# Patient Record
Sex: MALE | Race: AMERICAN INDIAN OR ALASKA NATIVE
[De-identification: names, ages, dates, MRNs, and addresses within clinical notes are randomized per-mention and may not be internally consistent; named-entity substitution may affect disease eponyms.]

---

## 2017-01-22 NOTE — EMERGENCY DEPARTMENT REPORT
HPI





- General


Chief Complaint: Psych


Time Seen by Provider: 01/22/17 02:36





- HPI


HPI: 





The patient is a 26-year-old male who presents for evaluation of mental health.

  The patient reports 1 day of constant and severe thoughts of sadness and 

suicidal ideation.  He states that he is concerned that he will harm himself, 

as he has thought of stabbing himself with a knife.  He also reports auditory 

hallucinations. The patient denies fever, headache, unexplained weight loss or 

weight gain, heat or cold intolerance, skin, hair, or nail changes, neuro 

deficits, homicidal ideations, or visual hallucinations.





ED Past Medical Hx





- Past Medical History


Previous Medical History?: Yes


Hx Hypertension: Yes


Hx Psychiatric Treatment: Yes (anxiety, schizophrenia, bipolar)





- Surgical History


Past Surgical History?: No





- Social History


Smoking Status: Current Every Day Smoker


Substance Use Type: Alcohol, Marijuana





- Medications


Home Medications: 


 Home Medications











 Medication  Instructions  Recorded  Confirmed  Last Taken  Type


 


Divalproex Dr [Depakote] 500 mg PO BID 10/06/14 10/08/15 10/08/15 History


 


Benztropine [Cogentin] 1 mg PO QHS 03/06/15 10/08/15 10/08/15 History


 


risperiDONE [RisperDAL] 5 mg PO QHS 10/08/15 10/08/15 10/08/15 History














ED Review of Systems


ROS: 


Stated complaint: MH EVAL


Other details as noted in HPI








Constitutional: denies: fever


ENT: denies: throat or neck pain


Respiratory: denies: cough, shortness of breath


Cardiovascular: denies: chest pain


Endocrine: denies unexplained weight loss or gain


Gastrointestinal: denies: abdominal pain, nausea


Genitourinary: denies: dysuria


Musculoskeletal: denies: leg swelling


Skin: denies: rash


Neurological: denies: headache


Hematological/Lymphatic: denies: easy bleeding or easy bruising  


Psych: reports sadness and SI











Physical Exam





- Physical Exam


Vital Signs: 


 Vital Signs











  01/22/17 01/22/17





  01:25 02:35


 


Temperature 98.0 F 98.5 F


 


Pulse Rate 114 H 80


 


Respiratory 18 151 H





Rate  


 


Blood Pressure 119/86 


 


Blood Pressure  127/75





[Right]  


 


O2 Sat by Pulse 98 99





Oximetry  











Physical Exam: 











General: well-nourished, well-developed, no acute distress


Head: Normocephalic, atraumatic


Eyes: normal sclera


ENT: Mucous membranes are pale and dry


Neck: trachea midline, neck supple, No neck stiffness, no cervical adenopathy


Respiratory: Breath sounds equal bilaterally, no wheezing, rales, or rhonchi


Cardio: S1 and S2 present, no murmurs, rubs, gallops, capillary refill is 

delayed


Abdomen: Normoactive bowel sounds, soft abdomen, no rigidity, no guarding or 

rebound tenderness


Musc: No pitting edema


Skin: No rash


Neuro: no facial drooping, normal speech


Psych: Normal affect











ED Course


 Vital Signs











  01/22/17 01/22/17





  01:25 02:35


 


Temperature 98.0 F 98.5 F


 


Pulse Rate 114 H 80


 


Respiratory 18 151 H





Rate  


 


Blood Pressure 119/86 


 


Blood Pressure  127/75





[Right]  


 


O2 Sat by Pulse 98 99





Oximetry  














ED Medical Decision Making





- Lab Data


Result diagrams: 


 01/22/17 02:20





 01/22/17 02:20





- Medical Decision Making





The patient was seen and examined by myself.  The patient is placed on a 

cardiac monitor and continuous pulse ox. On initial evaluation, the patient was 

found to be in no distress.  Labs are obtained. Lab results are grossly 

unremarkable.  The patient is medically clear.  Mental health is consulted. 

Mental health evaluates the patient and agrees that the patient is at risk of 

harm to self.  A 1013 is completed.  The patient will be admitted to a 

psychiatric facility once bed placement is obtained.





Critical care attestation.: 


If time is entered above; I have spent that time in minutes in the direct care 

of this critically ill patient, excluding procedure time.








ED Disposition


Clinical Impression: 


 Suicidal ideations


Disposition: DC/TX PSY HOSP/PSY UNIT


Is pt being admited?: No


Does the pt Need Aspirin: No


Condition: Stable


Referrals: 


PRIMARY CARE,MD [Primary Care Provider] - 3-5 Days


Time of Disposition: 03:08

## 2017-02-19 NOTE — EMERGENCY DEPARTMENT REPORT
HPI





- General


Chief Complaint: Psych


Time Seen by Provider: 02/19/17 09:06





- HPI


HPI: 





 


Chief complaint: Suicidal thoughts and hearing voices


HPI: Patient with a history of bipolar disorder and schizoaffective disorder 

presents today saying he is having suicidal thoughts and is hearing voices.  

Patient also complains of having some abdominal crampy pain with 4 episodes of 

diarrhea earlier that has since stopped.  Patient states he has trouble with 

constipation because someone is blocking his bowels.  Patient states that he 

did not take his medications yesterday as someone has been tampering with them.

  She would not elaborate further.


Mode of arrival:   private car 


Source: Patient


Began: Patient unable to say how long he's been having these feelings


Duration: Long-standing history of psychiatric illness


Context: See above


Quality: Currently pain-free


Severity: 0 out of 10


Improved with: Nothing


Worsened with: Nothing


Associated signs and symptoms: See above





ED Past Medical Hx





- Past Medical History


Hx Hypertension: Yes


Hx Psychiatric Treatment: Yes (anxiety, schizophrenia, bipolar)





- Surgical History


Past Surgical History?: No





- Social History


Smoking Status: Current Every Day Smoker


Substance Use Type: Alcohol





- Medications


Home Medications: 


 Home Medications











 Medication  Instructions  Recorded  Confirmed  Last Taken  Type


 


Divalproex Dr [Depakote] 500 mg PO BID 10/06/14 01/22/17 10/08/15 History


 


Benztropine [Cogentin] 1 mg PO QHS 03/06/15 01/22/17 10/08/15 History


 


risperiDONE [RisperDAL] 5 mg PO QHS 10/08/15 01/22/17 10/08/15 History


 


Haloperidol [Haldol] 5 mg PO HS 02/19/17 02/19/17 02/18/17 History














ED Review of Systems


ROS: 


Stated complaint: MH EVAL


Other details as noted in HPI


ROS





Constitutional: No fever 


ENT: No uri symptoms


Cardiovascular: No chest pain


Respiratory: No sob or cough


GI: No nausea vomiting 


: No dysuria frequency or urgency, 


Skin: No rash


Neuro: No focal weakness or numbness


Psych: He HPI


Hema/lymph: No edema





Physical Exam





- Physical Exam


Vital Signs: 


 Vital Signs











  02/19/17 02/19/17





  07:33 08:22


 


Temperature 98.1 F 


 


Pulse Rate 95 H 


 


Respiratory 18 20





Rate  


 


Blood Pressure 117/75 


 


O2 Sat by Pulse 99 98





Oximetry  











Physical Exam: 





GENERAL: The patient is well-developed well-nourished . 


HEENT: Normocephalic.  Atraumatic.  Extraocular motions are intact.  Patient 

has moist mucous membranes.


NECK: Supple.  No meningitic signs are noted.  There is no adenopathy noted.


CHEST/LUNGS: Clear to auscultation.  There is no respiratory distress noted.


HEART/CARDIOVASCULAR: Regular.  There is no tachycardia.  There is no gallop 

rub or murmur.


ABDOMEN: Abdomen is soft, nontender.  Patient has normal bowel sounds.  There 

is no abdominal distention.


SKIN: There is no rash.  There is no edema.  There is no diaphoresis.


NEURO: The patient is awake, alert, and oriented.  Patient is slightly 

paranoid.  The patient is cooperative.  The patient has no focal neurologic 

deficits.  The patient has normal speech.


MUSCULOSKELETAL: There is no tenderness or deformity.  There is no limitation 

range of motion.  There is no evidence of acute injury.





ED Course


 Vital Signs











  02/19/17 02/19/17





  07:33 08:22


 


Temperature 98.1 F 


 


Pulse Rate 95 H 


 


Respiratory 18 20





Rate  


 


Blood Pressure 117/75 


 


O2 Sat by Pulse 99 98





Oximetry  














- Reevaluation(s)


Reevaluation #1: 





02/19/17 


Patient evaluated by mental health crisis and 1013 was executed.








ED Medical Decision Making





- Lab Data


Result diagrams: 


 02/19/17 07:58





 02/19/17 07:58





 Laboratory Tests











  02/19/17 02/19/17





  07:58 07:58


 


Valproic Acid   9.1 L


 


Plasma/Serum Alcohol  < 0.01 








Urinalysis and urine drug screen are negative.


Critical care attestation.: 


If time is entered above; I have spent that time in minutes in the direct care 

of this critically ill patient, excluding procedure time.








ED Disposition


Clinical Impression: 


 Suicidal ideations





Disposition: DC/TX PSY HOSP/PSY UNIT


Is pt being admited?: No


Does the pt Need Aspirin: No


Condition: Stable


Time of Disposition: 10:00

## 2017-02-20 NOTE — CONSULTATION
History of Present Illness





- Reason for Consult


Consult date: 02/20/17


Reason for consult: evaluation for placement into emergency receiving facility





- Chief Complaint


Chief complaint: 





I thought they were messing with my medications





- History of Present Psychiatric Illness





This is a 26 year old domiciled male with PPH of Schizophrenia who presents due 

to either a persistent psychosis or a reemergence of psychosis over the past 

few days. Psychiatry was consulted to evaluate and to help facilitate placement 

of this patient. Upon interview, he corroborated the history noted in the 

remainder of the chart and that of the Mental Health assessors. Notably, that 

he was having thoughts of suicide and that he woke up not feeling well. He has 

been inconsistently adherent to his medications due to a paranoid belief that 

someone is tampering with the medication. 





Medications and Allergies


 Allergies











Allergy/AdvReac Type Severity Reaction Status Date / Time


 


quetiapine fumarate Allergy  Unknown Verified 02/19/17 07:33





[From Seroquel]     











 Home Medications











 Medication  Instructions  Recorded  Confirmed  Last Taken  Type


 


Divalproex Dr [Depakote] 500 mg PO BID 10/06/14 02/19/17 02/18/17 History


 


Benztropine [Cogentin] 1 mg PO QHS 03/06/15 02/19/17 02/18/17 History


 


risperiDONE [RisperDAL] 5 mg PO QHS 10/08/15 02/19/17 02/18/17 History


 


Haloperidol [Haldol] 5 mg PO HS 02/19/17 02/19/17 02/18/17 History














Mental Status Exam





- Vital signs


 Last Vital Signs











Temp  98.3 F   02/20/17 10:00


 


Pulse  70   02/20/17 10:00


 


Resp  18   02/20/17 10:00


 


BP  116/68   02/20/17 10:00


 


Pulse Ox  99   02/20/17 10:00














- Exam


Orientation: person


Affect: depressed, anxious


Mood: fearful, anxious


Thought content: obsessions, phobias, paranoia, somatic


Thought Process: Disorganized


Perceptions: auditory


Speech: slow


Concentration: distractible


Motor activity: lethargic


Level of consciousness: sedated


Memory: Recent Impaired, Remote Impaired


Sleep Symptoms: None


Interaction: guarded





Results


Result Diagrams: 


 02/19/17 07:58





 02/19/17 07:58


All other labs normal.








Assessment and Plan


Assessment and plan: 





This is a 26 year old domiciled male with schizophrenia who presents with 

worsening psychosis in the context of intermittent medication non-adherence 

associated to paranoia. We recommend transferring him to [facility], where he has been accepted by their emergency receiving psychiatric 

facility.

## 2017-04-11 NOTE — CONSULTATION
History of Present Illness





- Reason for Consult


Consult date: 04/11/17


Reason for consult: Psychiatry Follow-up


Requesting physician: SPARKLE ORTIZ





- Chief Complaint


Chief complaint: 


"I just need some help"








- History of Present Psychiatric Illness


27 y.o. AA male admitted to [facility] for psychosis. Today patient is calm, 

cooperative with a linear thought process. Patient acknowledged calling the 

police to pick him up from the group, because he wasn't "feeling right." He 

told me that he is having issues at his current group home and experiencing AVH'

s intermittently. He could not tell me what the visual hallucination is 

currently, but states the voices tell him to do all sorts of "crazy" things. I 

asked did the voices tell him to harm himself or anyone else, he states "yes" 

to harming himself, "no" to harming anyone else. He could not tell me if he had 

a suicide plan. He stated that he was hospitalized for hearing voices with 

suicidal thoughts in the past. He denies having issues with a group of 

residents or a particular person at the group home. He states that it's a lot 

of on going activity there and getting rest and sleep is a problem. He denies 

using recreational drugs, but drink seldom. He feels sad and hopeless and rate 

his depression 7/10, with 10 being the worst. He states that he is compliant 

with his medications. He denies HI's at this time.








Medications and Allergies


 Allergies











Allergy/AdvReac Type Severity Reaction Status Date / Time


 


quetiapine fumarate Allergy  Unknown Verified 02/19/17 07:33





[From Seroquel]     











 Home Medications











 Medication  Instructions  Recorded  Confirmed  Last Taken  Type


 


Divalproex Dr [Depakote] 500 mg PO BID 10/06/14 04/11/17 04/09/17 History


 


Benztropine [Cogentin] 1 mg PO QHS 03/06/15 04/11/17 04/09/17 History


 


risperiDONE [RisperDAL] 5 mg PO QHS 10/08/15 04/11/17 04/09/17 History


 


Haloperidol [Haldol] 5 mg PO HS 02/19/17 04/11/17 04/09/17 History











Active Meds: 


Active Medications





Benztropine Mesylate (Cogentin)  1 mg PO QHS JE


Divalproex Sodium (Depakote Dr)  500 mg PO BID [facility]


   Last Admin: 04/11/17 09:56 Dose:  500 mg


Haloperidol (Haldol)  5 mg PO HS JE


Risperidone (Risperdal)  3 mg PO QHS JE


Risperidone (Risperdal)  2 mg PO QHS JE











Past psychiatric history





- Past Medical History


Past Medical History: No medical history


Past Surgical History: No surgical history





- past Psychiatric treatment and history


Psych: Bipolar, Schizophrenia


psychiatric treatment history: 


Per patient been to [facility]. Denies fam psy hx. 








- Social History


Social history: other (Lives at a group home, did not finish , )





Mental Status Exam





- Vital signs


 Last Vital Signs











Temp  97.8 F   04/11/17 07:40


 


Pulse  70   04/11/17 07:40


 


Resp  20   04/11/17 07:40


 


BP  101/66   04/11/17 07:40


 


Pulse Ox  99   04/11/17 07:40














- Exam


Narrative exam: 


ROS (-) Suicidal Thoughts, (+) Depression





Orientation: time, place, person


Affect: flat


Mood: congruent with affect


Thought content: other (None)


Thought Process: Intact


Perceptions: none


Speech: normal rate and pattern


Concentration: other (Intact)


Motor activity: other (Lying in bed)


Level of consciousness: alert


Memory: Intact


Sleep Symptoms: None


Interaction: cooperative, pleasant





Results


Result Diagrams: 


 04/11/17 02:36





 04/11/17 02:36


 Abnormal lab results











  04/11/17 04/11/17 04/11/17 Range/Units





  02:36 02:36 04:10 


 


MCHC   35 H   (32-34)  %


 


Mono % (Auto)   8.5 H   (0.0-7.3)  %


 


BUN  6 L    (9-20)  mg/dL


 


Valproic Acid    13.8 L  ()  ug/mL








All other labs normal.








Assessment and Plan


Assessment and plan: 


Impression: Psychosis NOS. 27 y.o. AA male admitted to [facility] for psychosis. 

Today patient is calm, cooperative with a linear thought process. Patient 

acknowledged calling the police to pick him up from the group, because he wasn'

t "feeling right." He admit suicidal thoughts and AVH's intermittently. He 

stated that he is compliant with his medication, but have not taken his 

depakote regularly. VA level 13.8. DD: R/O Bipolar





Recommendation/Plan: Continue 1013 with possible placement to inpatient psy 

services. Metabolic side effects discussed with patient reference 

antipsychotics.

## 2017-04-11 NOTE — EMERGENCY DEPARTMENT REPORT
ED Psych HPI





- General


Chief Complaint: Psych


Stated Complaint: MH EVAL/RT FOOT PAIN


Time Seen by Provider: 04/11/17 03:23


Source: patient


Mode of arrival: Ambulatory





- History of Present Illness


Initial Comments: 





Aline is a pleasant young man who lives in a group home.  He states he has 

difficulties in the group home of getting sleep due to all the activity going 

on.  He states he is, constantly hearing voices in his head telling him to do 

all sorts of things as well.  He finds a voices distracting and difficult to 

concentrate and sleep as well.  He feels frequently that the voices are telling 

him to do harm to himself.  He does not have any homicidal ideations.  He does 

not have a specific suicidal plan but has had thoughts of suicide.  Patient 

states he feels that he needs inpatient psychiatric therapy.  He denies any 

drugs of abuse.  He denies illicit.  He states he is compliant with his 

medications.  He does have a psychiatrist here in town when she follows up with 

routinely.  He states his last appointment was 2 weeks ago.  Patient reports 

feeling comfortable with this psychiatrist.  He states in general he is 

comfortable with his group home but that he just feels he needs a break so that 

he can get better sleep and get his "head on straight."





Patient also complains of tinea pedis times long time that is gone untreated.  

It is itchy.  It sometimes causes his feet to burn


Associated Psychiatric Symptoms: depression, suicidal ideation


History of same: Yes


Treatments Prior to Arrival: none





- Related Data


 Home Medications











 Medication  Instructions  Recorded  Confirmed  Last Taken


 


Divalproex Dr [Depakote] 500 mg PO BID 10/06/14 02/19/17 02/18/17


 


Benztropine [Cogentin] 1 mg PO QHS 03/06/15 02/19/17 02/18/17


 


risperiDONE [RisperDAL] 5 mg PO QHS 10/08/15 02/19/17 02/18/17


 


Haloperidol [Haldol] 5 mg PO HS 02/19/17 02/19/17 02/18/17











 Allergies











Allergy/AdvReac Type Severity Reaction Status Date / Time


 


quetiapine fumarate Allergy  Unknown Verified 02/19/17 07:33





[From Seroquel]     














ED Review of Systems


ROS: 


Stated complaint: MH EVAL/RT FOOT PAIN


Other details as noted in HPI





Comment: All other systems reviewed and negative


Constitutional: denies: chills, fever


Eyes: denies: eye pain, eye discharge, vision change


ENT: denies: ear pain, throat pain


Respiratory: denies: cough, shortness of breath, wheezing


Cardiovascular: denies: chest pain, palpitations


Endocrine: no symptoms reported


Gastrointestinal: denies: abdominal pain, nausea, diarrhea


Genitourinary: denies: urgency, dysuria


Musculoskeletal: denies: back pain, joint swelling, arthralgia


Skin: other (skin lesions right foot.).  denies: rash, lesions


Neurological: denies: headache, weakness, paresthesias


Psychiatric: anxiety, depression, suicidal thoughts


Hematological/Lymphatic: denies: easy bleeding, easy bruising





ED Past Medical Hx





- Past Medical History


Previous Medical History?: Yes


Hx Hypertension: Yes


Hx Psychiatric Treatment: Yes (anxiety, schizophrenia, bipolar)





- Surgical History


Past Surgical History?: Yes





- Social History


Smoking Status: Current Every Day Smoker


Substance Use Type: Alcohol





- Medications


Home Medications: 


 Home Medications











 Medication  Instructions  Recorded  Confirmed  Last Taken  Type


 


Divalproex Dr [Depakote] 500 mg PO BID 10/06/14 02/19/17 02/18/17 History


 


Benztropine [Cogentin] 1 mg PO QHS 03/06/15 02/19/17 02/18/17 History


 


risperiDONE [RisperDAL] 5 mg PO QHS 10/08/15 02/19/17 02/18/17 History


 


Haloperidol [Haldol] 5 mg PO HS 02/19/17 02/19/17 02/18/17 History














ED Physical Exam





- General


Limitations: No Limitations


General appearance: alert, in no apparent distress





- Head


Head exam: Present: atraumatic, normocephalic





- Eye


Eye exam: Present: normal appearance, EOMI.  Absent: scleral icterus





- ENT


ENT exam: Present: normal exam, normal orophraynx, mucous membranes moist





- Neck


Neck exam: Present: normal inspection.  Absent: tenderness, meningismus, 

lymphadenopathy





- Respiratory


Respiratory exam: Present: normal lung sounds bilaterally.  Absent: respiratory 

distress, wheezes, rales





- Cardiovascular


Cardiovascular Exam: Present: regular rate, normal rhythm.  Absent: systolic 

murmur, diastolic murmur, rubs, gallop





- GI/Abdominal


GI/Abdominal exam: Present: soft, normal bowel sounds.  Absent: tenderness, 

guarding





- Rectal


Rectal exam: Present: deferred





- Extremities Exam


Extremities exam: Present: other (right foot increases and on the ball of foot 

with chronic skin changes consistent with tinea pedis.).  Absent: pedal edema, 

joint swelling, calf tenderness





- Back Exam


Back exam: Present: normal inspection, full ROM.  Absent: tenderness, CVA 

tenderness (R), CVA tenderness (L)





- Neurological Exam


Neurological exam: Present: alert, oriented X3





- Psychiatric


Psychiatric exam: Present: normal affect, normal mood, suicidal ideation





- Skin


Skin exam: Present: warm, dry, intact, normal color.  Absent: rash





ED Course





 Vital Signs











  04/11/17 04/11/17





  02:09 02:24


 


Temperature 98.1 F 98.1 F


 


Pulse Rate 74 75


 


Respiratory 18 18





Rate  


 


Blood Pressure  118/81


 


Blood Pressure 118/61 





[Right]  


 


O2 Sat by Pulse 100 100





Oximetry  














- Reevaluation(s)


Reevaluation #1: 





04/11/17 05:23


Patient very pleasant and calm here.  He is speaking in normal rate and tone.  

He does endorse suicidal thoughts.  He states that there is some more of a 

flight of ideas.  He states he is voices that she talk to him.  He reports the 

voices are sometimes helpful and sometimes mean.  Again his main concern is in 

his opinion is that he is just not getting enough sleep to deal with his 

problems well.  He strongly expresses a desire to be in inpatient psychiatry.  

To some degree I feel like he is borderline for needing inpatient care.  He 

does legitimately endorse some suicidal thoughts spell.  He states he has never 

acted out on the suicidal gesture in the past.  He states he's always been 

compliant with his medications.  These are reasons why I feel these low risk in 

general.  Despite this for now I will have him on 1013 for further evaluation 

by crisis representative as well as psychiatry.  Intake labs have been 

evaluated and are normal.  Patient is medically clear from my standpoint.





Of secondary note patient does demonstrate tinea pedis.  This is gone untreated 

for several years it appears.  I did encourage him to get this finally treated.

  It'll require about 8-12 weeks of continuous treatment with some type of 

antifungal medication.





ED Medical Decision Making





- Lab Data


Result diagrams: 


 04/11/17 02:36





 04/11/17 02:36


Critical care attestation.: 


If time is entered above; I have spent that time in minutes in the direct care 

of this critically ill patient, excluding procedure time.








ED Disposition


Clinical Impression: 


 Suicidal ideations, Tinea pedis of right foot





Depression


Qualifiers:


 Depression Type: major depressive disorder Major depression recurrence: 

recurrent Active/Remission status: currently active Major depression episode 

severity: moderate Qualified Code(s): F33.1 - Major depressive disorder, 

recurrent, moderate





Disposition: DC/TX PSY HOSP/PSY UNIT


Is pt being admited?: No


Does the pt Need Aspirin: No


Condition: Stable


Referrals: 


PRIMARY CARE,MD [Primary Care Provider] - 3-5 Days


Time of Disposition: 05:25

## 2017-04-23 NOTE — EMERGENCY DEPARTMENT REPORT
ED Psych HPI





- General


Chief Complaint: Psych


Stated Complaint: HOMICIDAL/SUICIDAL


Time Seen by Provider: 04/23/17 00:58


Source: patient, police


Mode of arrival: Ambulatory


Limitations: No Limitations





- History of Present Illness


Initial Comments: 


This is a 21-year-old male.  He has a history of psychosis.  He presents to the 

ER complaining of suicidality or hallucinations.  He does not of access to guns 

or firearms.  He denies toxic ingestions.  He denies headache, neck pain, chest 

pain, abdominal pain or shortness of breath.  He denies testicular pain.  He 

denies irritative obstructive urinary symptoms.





He cannot describe exacerbating or relieving factors.





MD Complaint: suicidal ideation


-: Gradual


Associated Psychiatric Symptoms: suicidal ideation


History of same: Yes


Quality: intermittent


Improves With: none


Worsens With: none


Associated Symptoms: denies: confusion, headache, shortness of breath, nausea, 

vomiting, syncope, insomnia


If Self Harm: admits thoughts of





- Related Data


 Home Medications











 Medication  Instructions  Recorded  Confirmed  Last Taken


 


Divalproex Dr [Depakote] 500 mg PO BID 10/06/14 04/23/17 04/09/17


 


Benztropine [Cogentin] 1 mg PO QHS 03/06/15 04/23/17 04/09/17


 


risperiDONE [RisperDAL] 5 mg PO QHS 10/08/15 04/23/17 04/09/17


 


Haloperidol [Haldol] 5 mg PO HS 02/19/17 04/23/17 04/09/17











 Allergies











Allergy/AdvReac Type Severity Reaction Status Date / Time


 


quetiapine fumarate Allergy  Unknown Verified 02/19/17 07:33





[From Seroquel]     














ED Review of Systems


ROS: 


Stated complaint: HOMICIDAL/SUICIDAL


Other details as noted in HPI





Constitutional: denies: fever


Eyes: denies: vision change


ENT: denies: epistaxis


Respiratory: denies: cough


Cardiovascular: denies: chest pain


Gastrointestinal: denies: abdominal pain, nausea, diarrhea


Genitourinary: denies: urgency, dysuria


Musculoskeletal: back pain.  denies: joint swelling, arthralgia


Skin: lesions.  denies: rash


Neurological: denies: headache, weakness, paresthesias


Psychiatric: denies: homicidal thoughts, suicidal thoughts





ED Past Medical Hx





- Past Medical History


Previous Medical History?: Yes


Hx Hypertension: Yes


Hx Psychiatric Treatment: Yes (anxiety, schizophrenia, bipolar)





- Surgical History


Past Surgical History?: No





- Social History


Smoking Status: Never Smoker


Substance Use Type: None





- Medications


Home Medications: 


 Home Medications











 Medication  Instructions  Recorded  Confirmed  Last Taken  Type


 


Divalproex Dr [Depakote] 500 mg PO BID 10/06/14 04/23/17 04/09/17 History


 


Benztropine [Cogentin] 1 mg PO QHS 03/06/15 04/23/17 04/09/17 History


 


risperiDONE [RisperDAL] 5 mg PO QHS 10/08/15 04/23/17 04/09/17 History


 


Haloperidol [Haldol] 5 mg PO HS 02/19/17 04/23/17 04/09/17 History














ED Physical Exam





- General


Limitations: No Limitations


General appearance: alert, in no apparent distress





- Head


Head exam: Present: atraumatic, normocephalic





- Eye


Eye exam: Present: normal appearance, EOMI.  Absent: nystagmus





- ENT


ENT exam: Present: normal exam, normal orophraynx, mucous membranes moist, 

normal external ear exam





- Neck


Neck exam: Present: normal inspection, full ROM.  Absent: tenderness, 

meningismus





- Respiratory


Respiratory exam: Present: normal lung sounds bilaterally.  Absent: respiratory 

distress, wheezes, rales, rhonchi, stridor, chest wall tenderness





- Cardiovascular


Cardiovascular Exam: Present: regular rate, normal rhythm, normal heart sounds.

  Absent: bradycardia, tachycardia, irregular rhythm, systolic murmur, 

diastolic murmur, rubs, gallop





- GI/Abdominal


GI/Abdominal exam: Present: soft, normal bowel sounds.  Absent: distended, 

tenderness, guarding, rebound, rigid, pulsatile mass





- Rectal


Rectal exam: Present: deferred





- Extremities Exam


Extremities exam: Present: normal inspection, full ROM, normal capillary 

refill.  Absent: pedal edema, joint swelling, calf tenderness





- Back Exam


Back exam: Present: normal inspection, other (left paralumbar region, there is 

a small area of induration, with minimal drainage.  There is no large area of 

streaking.).  Absent: CVA tenderness (L), muscle spasm, paraspinal tenderness, 

vertebral tenderness





- Neurological Exam


Neurological exam: Present: alert, oriented X3, normal gait, other (Extraocular 

movements intact.  Tongue midline.  No facial droop.  Facial sensation intact 

to light touch in the V1, V2, V3 distribution bilaterally.  5 and 5 strength in 

4 extremities..  Sensation is intact to light touch in 4 extremities.).  Absent

: motor sensory deficit





- Psychiatric


Psychiatric exam: Present: suicidal ideation.  Absent: homicidal ideation





- Skin


Skin exam: Present: warm, dry, intact, normal color.  Absent: rash





ED Course


 Vital Signs











  04/23/17





  01:15


 


Temperature 99.3 F


 


Pulse Rate 93 H


 


Respiratory 18





Rate 


 


Blood Pressure 109/78


 


Blood Pressure 109/78





[Right] 


 


O2 Sat by Pulse 99





Oximetry 














- Reevaluation(s)


Reevaluation #1: 





04/23/17 04:31





Differential diagnosis: Medical clearance for psychiatric placement, 

psychiatric to compensation, suicidality, skin pustule





Assessment and plan: 27-year-old male with suicidality.  Walks with a steady 

gait.  Has a GCS of 15, with an NIH score of 0.  Clinically sober.  No 

indication of trauma.  A 1013 form is filled out by me.





Has a small area of left paralumbar pustule that is draining, possible 

component of cellulitis.  He'll be started on doxycycline for this.  His 

medications haven't reconciled.  Urinalysis is pending.





At this point in time, it does not appear that there is any immediate medical 

complications to psychiatric admission/evaluation.  The crisis worker is 

informed.











ED Medical Decision Making





- Lab Data


Result diagrams: 


 04/23/17 01:01





 04/23/17 01:01








 Vital Signs











  04/23/17





  01:15


 


Temperature 99.3 F


 


Pulse Rate 93 H


 


Respiratory 18





Rate 


 


Blood Pressure 109/78


 


Blood Pressure 109/78





[Right] 


 


O2 Sat by Pulse 99





Oximetry 














 Lab Results











  04/23/17 04/23/17 04/23/17 Range/Units





  01:01 01:01 01:01 


 


WBC  10.0    (4.5-11.0)  K/mm3


 


RBC  4.61    (3.65-5.03)  M/mm3


 


Hgb  14.6    (11.8-15.2)  gm/dl


 


Hct  42.6    (35.5-45.6)  %


 


MCV  92    (84-94)  fl


 


MCH  32    (28-32)  pg


 


MCHC  34    (32-34)  %


 


RDW  13.8    (13.2-15.2)  %


 


Plt Count  197    (140-440)  K/mm3


 


Lymph % (Auto)  20.0    (13.4-35.0)  %


 


Mono % (Auto)  8.7 H    (0.0-7.3)  %


 


Eos % (Auto)  1.2    (0.0-4.3)  %


 


Baso % (Auto)  0.7    (0.0-1.8)  %


 


Lymph #  2.0    (1.2-5.4)  K/mm3


 


Mono #  0.9 H    (0.0-0.8)  K/mm3


 


Eos #  0.1    (0.0-0.4)  K/mm3


 


Baso #  0.1    (0.0-0.1)  K/mm3


 


Seg Neutrophils %  69.4    (40.0-70.0)  %


 


Seg Neutrophils #  7.0    (1.8-7.7)  K/mm3


 


Sodium   141   (137-145)  mmol/L


 


Potassium   3.9   (3.6-5.0)  mmol/L


 


Chloride   100.3   ()  mmol/L


 


Carbon Dioxide   26   (22-30)  mmol/L


 


Anion Gap   19   mmol/L


 


BUN   9   (9-20)  mg/dL


 


Creatinine   1.0   (0.8-1.5)  mg/dL


 


Estimated GFR   > 60   ml/min


 


BUN/Creatinine Ratio   9.00   %


 


Glucose   92   ()  mg/dL


 


Calcium   9.1   (8.4-10.2)  mg/dL


 


Total Creatine Kinase     ()  units/L


 


Salicylates     (2.8-20.0)  mg/dL


 


Acetaminophen     (10.0-30.0)  ug/mL


 


Plasma/Serum Alcohol    < 0.01  (0-0.07)  gm%














  04/23/17 04/23/17 04/23/17 Range/Units





  Unknown Unknown Unknown 


 


WBC     (4.5-11.0)  K/mm3


 


RBC     (3.65-5.03)  M/mm3


 


Hgb     (11.8-15.2)  gm/dl


 


Hct     (35.5-45.6)  %


 


MCV     (84-94)  fl


 


MCH     (28-32)  pg


 


MCHC     (32-34)  %


 


RDW     (13.2-15.2)  %


 


Plt Count     (140-440)  K/mm3


 


Lymph % (Auto)     (13.4-35.0)  %


 


Mono % (Auto)     (0.0-7.3)  %


 


Eos % (Auto)     (0.0-4.3)  %


 


Baso % (Auto)     (0.0-1.8)  %


 


Lymph #     (1.2-5.4)  K/mm3


 


Mono #     (0.0-0.8)  K/mm3


 


Eos #     (0.0-0.4)  K/mm3


 


Baso #     (0.0-0.1)  K/mm3


 


Seg Neutrophils %     (40.0-70.0)  %


 


Seg Neutrophils #     (1.8-7.7)  K/mm3


 


Sodium     (137-145)  mmol/L


 


Potassium     (3.6-5.0)  mmol/L


 


Chloride     ()  mmol/L


 


Carbon Dioxide     (22-30)  mmol/L


 


Anion Gap     mmol/L


 


BUN     (9-20)  mg/dL


 


Creatinine     (0.8-1.5)  mg/dL


 


Estimated GFR     ml/min


 


BUN/Creatinine Ratio     %


 


Glucose     ()  mg/dL


 


Calcium     (8.4-10.2)  mg/dL


 


Total Creatine Kinase  279 H    ()  units/L


 


Salicylates   < 0.3 L   (2.8-20.0)  mg/dL


 


Acetaminophen    < 15.0  (10.0-30.0)  ug/mL


 


Plasma/Serum Alcohol     (0-0.07)  gm%














Critical care attestation.: 


If time is entered above; I have spent that time in minutes in the direct care 

of this critically ill patient, excluding procedure time.








ED Disposition


Clinical Impression: 


 Suicidal ideations, Depression





Disposition: DC/TX PSY HOSP/PSY UNIT


Is pt being admited?: No


Does the pt Need Aspirin: No


Condition: Good


Referrals: 


PRIMARY CARE,MD [Primary Care Provider] - 3-5 Days

## 2017-04-25 NOTE — PROGRESS NOTE
Subjective





- Reason for Consult


Consult date: 04/25/17


Reason for consult: Psychiatry Follow-up





- Chief Complaint


Chief complaint: 


"Feel much better today"





This is a 27-year-old AA male. He presents to the ER complaining of suicidality 

or hallucinations. Today patient is calm and cooperative with a circumstantial 

thought process. Patient stated that he feels much better today than yesterday. 

He felt that he needed rest. He stated, "When I don't rest I feel bad mentally.

" He stated that "coping" is key for him to be "productive." He denies SI/HI's, 

AVH's, depression, or a poor appetite. He stated that he slept well last night. 

Patient would like information about therapists in his local area. 





Mental Status Exam





- Vital signs


 Last Vital Signs











Temp  97.8 F   04/25/17 08:34


 


Pulse  74   04/25/17 08:34


 


Resp  18   04/25/17 08:35


 


BP  100/56   04/25/17 08:34


 


Pulse Ox  100   04/25/17 08:34














- Exam


Narrative exam: 


MSE:





 Appearance: calm, cooperative 


 Behavior: poor eye contact 


 Speech: regular rate and tone 


 Mood: "Not depressed" 


 Affect: flat


 Thought Process: circumstantial  


 Thought Content: denies SI/HI's and AVH's


 Motor Activity: ambulatory 


 Cognition: A/Ox3


 Insight: fair


 Judgment: fair








Assessment and Plan


Impression: This is a 27-year-old AA male. He presents to the ER complaining of 

suicidality or hallucinations. Today patient is calm and cooperative with a 

circumstantial thought process. Patient stated that he feels much better today 

than yesterday. He felt that he needed rest. He stated, "When I don't rest I 

feel bad mentally." He stated that "coping" is key for him to be "productive." 

He denies SI/HI's, AVH's, depression, or a poor appetite.





Recommendation/Plan: Continue 1013 with possible placement to inpatient or 

outpatient psy services ([facility]). Continue current medication treatment.

## 2017-04-26 NOTE — PROGRESS NOTE
Subjective





- Reason for Consult


Consult date: 04/26/17


Reason for consult: psychiatric follow up





- Chief Complaint


Chief complaint: 


"I'm tired of living there"


Impression: This is a 27-year-old AA male. He presents to the ER complaining of 

suicidality or hallucinations. Today patient is calm and cooperative with a 

circumstantial/tangential thought process. He denies SI/HI's, AVH's, depression

, or a poor appetite. He required redirection several times. Speech pressured. 

He talked about wanting to live independently.





Mental Status Exam





- Vital signs


 Last Vital Signs











Temp  97.7 F   04/26/17 09:32


 


Pulse  56 L  04/26/17 09:32


 


Resp  20   04/26/17 09:32


 


BP  100/50   04/26/17 09:32


 


Pulse Ox  100   04/26/17 09:32














Assessment and Plan





MSE:





 Appearance: calm, cooperative 


 Behavior: poor eye contact 


 Speech: pressured speech 


 Mood: anxious 


 Affect: flat


 Thought Process: circumstantial, tangential


 Thought Content: denies SI/HI's and AVH's


 Motor Activity: ambulatory 


 Cognition: A/Ox3


 Insight: fair


 Judgment: fair








Impression: This is a 27-year-old AA male. He presents to the ER complaining of 

suicidality or hallucinations. Today patient is calm and cooperative with a 

circumstantial/tangential thought process. He denies SI/HI's, AVH's, depression

, or a poor appetite. He required redirection several times. Speech pressured.





Recommendation/Plan: Continue 1013 with placement to inpatient. Continue 

current medication treatment.

## 2017-04-27 NOTE — PROGRESS NOTE
Subjective





- Reason for Consult


Consult date: 04/27/17


Reason for consult: Psychiatry Follow-up





- Chief Complaint


Chief complaint: 


"I'm ready to be discharged"





This is a 27-year-old AA male. He presents to the ER complaining of suicidality 

or hallucinations. Today patient is calm and cooperative with a circumstantial  

thought process. He stated that he would like to see a counselor weekly. He 

stated, "I need that in my life." He denies SI/HI's, AVH's, depression, poor 

appetite. Also, he denies sleep disturbance and no side effects from his psy 

medications. 





Mental Status Exam





- Vital signs


 Last Vital Signs











Temp  98.2 F   04/26/17 20:17


 


Pulse  63   04/26/17 20:17


 


Resp  16   04/26/17 20:17


 


BP  103/62   04/26/17 20:17


 


Pulse Ox  100   04/26/17 20:17














- Exam


Narrative exam: 


MSE:





 Appearance: calm, cooperative 


 Behavior: poor eye contact 


 Speech: regular rate and tone 


 Mood: "Not depressed" 


 Affect: flat


 Thought Process: circumstantial  


 Thought Content: denies SI/HI's and AVH's


 Motor Activity: ambulatory 


 Cognition: A/Ox3


 Insight: fair


 Judgment: fair








Assessment and Plan


Impression: This is a 27-year-old AA male. He presents to the ER complaining of 

suicidality or hallucinations. Today patient is calm and cooperative with a 

circumstantial  thought process. He stated that he would like to see a 

counselor weekly. He stated, "I need that in my life." He denies SI/HI's, AVH's

, depression, poor appetite. Patient is no threat to himself or anyone else.





Recommendation/Plan: Rescind 1013. [facility] information given to patient for 

outpatient psy services.

## 2017-04-28 NOTE — EVENT NOTE
Date: 04/28/17





Psychiatric consultation and evaluation is reviewed and appreciated.  

Psychiatry has discontinued the patient's 1013.





I go back to reevaluate the patient.  He is alert and oriented 3.  He has a 

GCS of 15, with an NIH score of 0, walks with steady gait, he is not homicidal 

or suicidal.  The lesions on the back appear to have been healing well.  

Psychiatrist recommendations are reviewed and appreciated.  The patient is seen 

at outpatient psychiatric service at [facility].  Patient was also given 

information for the [facility].  Patient will be discharged at this time.  

He is clinically sober at this time.  Return precautions are extensively 

reviewed.  He has no acute medical complaints.


Labs











  04/23/17 04/23/17 04/23/17





  01:01 01:01 01:01


 


WBC  10.0  


 


RBC  4.61  


 


Hgb  14.6  


 


Hct  42.6  


 


MCV  92  


 


MCH  32  


 


MCHC  34  


 


RDW  13.8  


 


Plt Count  197  


 


Lymph % (Auto)  20.0  


 


Mono % (Auto)  8.7 H  


 


Eos % (Auto)  1.2  


 


Baso % (Auto)  0.7  


 


Lymph #  2.0  


 


Mono #  0.9 H  


 


Eos #  0.1  


 


Baso #  0.1  


 


Seg Neutrophils %  69.4  


 


Seg Neutrophils #  7.0  


 


Sodium   141 


 


Potassium   3.9 


 


Chloride   100.3 


 


Carbon Dioxide   26 


 


Anion Gap   19 


 


BUN   9 


 


Creatinine   1.0 


 


Estimated GFR   > 60 


 


BUN/Creatinine Ratio   9.00 


 


Glucose   92 


 


Calcium   9.1 


 


AST   


 


ALT   


 


Alkaline Phosphatase   


 


Total Creatine Kinase   


 


Urine Color   


 


Urine Turbidity   


 


Urine pH   


 


Ur Specific Gravity   


 


Urine Protein   


 


Urine Glucose (UA)   


 


Urine Ketones   


 


Urine Blood   


 


Urine Nitrite   


 


Urine Bilirubin   


 


Urine Urobilinogen   


 


Ur Leukocyte Esterase   


 


Urine WBC (Auto)   


 


Urine RBC (Auto)   


 


U Epithel Cells (Auto)   


 


Urine Mucus   


 


Salicylates   


 


Urine Opiates Screen   


 


Urine Methadone Screen   


 


Acetaminophen   


 


Ur Barbiturates Screen   


 


Valproic Acid   


 


Ur Phencyclidine Scrn   


 


Ur Amphetamines Screen   


 


U Benzodiazepines Scrn   


 


Urine Cocaine Screen   


 


U Marijuana (THC) Screen   


 


Drugs of Abuse Note   


 


Plasma/Serum Alcohol    < 0.01














  04/23/17 04/23/17 04/23/17





  04:33 Unknown Unknown


 


WBC   


 


RBC   


 


Hgb   


 


Hct   


 


MCV   


 


MCH   


 


MCHC   


 


RDW   


 


Plt Count   


 


Lymph % (Auto)   


 


Mono % (Auto)   


 


Eos % (Auto)   


 


Baso % (Auto)   


 


Lymph #   


 


Mono #   


 


Eos #   


 


Baso #   


 


Seg Neutrophils %   


 


Seg Neutrophils #   


 


Sodium   


 


Potassium   


 


Chloride   


 


Carbon Dioxide   


 


Anion Gap   


 


BUN   


 


Creatinine   


 


Estimated GFR   


 


BUN/Creatinine Ratio   


 


Glucose   


 


Calcium   


 


AST   


 


ALT   


 


Alkaline Phosphatase   


 


Total Creatine Kinase   


 


Urine Color   Yellow 


 


Urine Turbidity   Clear 


 


Urine pH   6.0 


 


Ur Specific Gravity   1.013 


 


Urine Protein   <15 mg/dl 


 


Urine Glucose (UA)   Neg 


 


Urine Ketones   Neg 


 


Urine Blood   Neg 


 


Urine Nitrite   Neg 


 


Urine Bilirubin   Neg 


 


Urine Urobilinogen   2.0 


 


Ur Leukocyte Esterase   Neg 


 


Urine WBC (Auto)   3.0 


 


Urine RBC (Auto)   1.0 


 


U Epithel Cells (Auto)   1.0 


 


Urine Mucus   Few 


 


Salicylates   


 


Urine Opiates Screen    Presumptive negative


 


Urine Methadone Screen    Presumptive negative


 


Acetaminophen   


 


Ur Barbiturates Screen    Presumptive negative


 


Valproic Acid  11.6 L  


 


Ur Phencyclidine Scrn    Presumptive negative


 


Ur Amphetamines Screen    Presumptive negative


 


U Benzodiazepines Scrn    Presumptive negative


 


Urine Cocaine Screen    Presumptive negative


 


U Marijuana (THC) Screen    Presumptive negative


 


Drugs of Abuse Note    Disclamer


 


Plasma/Serum Alcohol   














  04/23/17 04/23/17 04/23/17





  Unknown Unknown Unknown


 


WBC   


 


RBC   


 


Hgb   


 


Hct   


 


MCV   


 


MCH   


 


MCHC   


 


RDW   


 


Plt Count   


 


Lymph % (Auto)   


 


Mono % (Auto)   


 


Eos % (Auto)   


 


Baso % (Auto)   


 


Lymph #   


 


Mono #   


 


Eos #   


 


Baso #   


 


Seg Neutrophils %   


 


Seg Neutrophils #   


 


Sodium   


 


Potassium   


 


Chloride   


 


Carbon Dioxide   


 


Anion Gap   


 


BUN   


 


Creatinine   


 


Estimated GFR   


 


BUN/Creatinine Ratio   


 


Glucose   


 


Calcium   


 


AST   


 


ALT   


 


Alkaline Phosphatase   


 


Total Creatine Kinase  279 H  


 


Urine Color   


 


Urine Turbidity   


 


Urine pH   


 


Ur Specific Gravity   


 


Urine Protein   


 


Urine Glucose (UA)   


 


Urine Ketones   


 


Urine Blood   


 


Urine Nitrite   


 


Urine Bilirubin   


 


Urine Urobilinogen   


 


Ur Leukocyte Esterase   


 


Urine WBC (Auto)   


 


Urine RBC (Auto)   


 


U Epithel Cells (Auto)   


 


Urine Mucus   


 


Salicylates   < 0.3 L 


 


Urine Opiates Screen   


 


Urine Methadone Screen   


 


Acetaminophen    < 15.0


 


Ur Barbiturates Screen   


 


Valproic Acid   


 


Ur Phencyclidine Scrn   


 


Ur Amphetamines Screen   


 


U Benzodiazepines Scrn   


 


Urine Cocaine Screen   


 


U Marijuana (THC) Screen   


 


Drugs of Abuse Note   


 


Plasma/Serum Alcohol   














  04/24/17 04/24/17





  13:47 13:47


 


WBC  


 


RBC  


 


Hgb  


 


Hct  


 


MCV  


 


MCH  


 


MCHC  


 


RDW  


 


Plt Count  


 


Lymph % (Auto)  


 


Mono % (Auto)  


 


Eos % (Auto)  


 


Baso % (Auto)  


 


Lymph #  


 


Mono #  


 


Eos #  


 


Baso #  


 


Seg Neutrophils %  


 


Seg Neutrophils #  


 


Sodium  


 


Potassium  


 


Chloride  


 


Carbon Dioxide  


 


Anion Gap  


 


BUN  


 


Creatinine  


 


Estimated GFR  


 


BUN/Creatinine Ratio  


 


Glucose  


 


Calcium  


 


AST   11


 


ALT  6 L 


 


Alkaline Phosphatase   77


 


Total Creatine Kinase  


 


Urine Color  


 


Urine Turbidity  


 


Urine pH  


 


Ur Specific Gravity  


 


Urine Protein  


 


Urine Glucose (UA)  


 


Urine Ketones  


 


Urine Blood  


 


Urine Nitrite  


 


Urine Bilirubin  


 


Urine Urobilinogen  


 


Ur Leukocyte Esterase  


 


Urine WBC (Auto)  


 


Urine RBC (Auto)  


 


U Epithel Cells (Auto)  


 


Urine Mucus  


 


Salicylates  


 


Urine Opiates Screen  


 


Urine Methadone Screen  


 


Acetaminophen  


 


Ur Barbiturates Screen  


 


Valproic Acid  


 


Ur Phencyclidine Scrn  


 


Ur Amphetamines Screen  


 


U Benzodiazepines Scrn  


 


Urine Cocaine Screen  


 


U Marijuana (THC) Screen  


 


Drugs of Abuse Note  


 


Plasma/Serum Alcohol  








 Vital Signs











  04/23/17 04/23/17 04/23/17





  01:15 09:32 21:41


 


Temperature 99.3 F 98.5 F 98.8 F


 


Pulse Rate 93 H 68 82


 


Respiratory 18 18 20





Rate   


 


Blood Pressure 109/78  


 


Blood Pressure 109/78 108/60 100/65





[Right]   


 


O2 Sat by Pulse 99 99 100





Oximetry   














  04/24/17 04/24/17 04/24/17





  06:12 14:26 20:27


 


Temperature  98.2 F 98.4 F


 


Pulse Rate  92 H 87


 


Respiratory 20 16 20





Rate   


 


Blood Pressure   


 


Blood Pressure  104/68 107/66





[Right]   


 


O2 Sat by Pulse 100 99 100





Oximetry   














  04/24/17 04/25/17 04/25/17





  20:29 08:34 08:35


 


Temperature  97.8 F 


 


Pulse Rate  74 


 


Respiratory 20 18 18





Rate   


 


Blood Pressure   


 


Blood Pressure  100/56 





[Right]   


 


O2 Sat by Pulse 100 100 





Oximetry   














  04/25/17 04/25/17 04/26/17





  20:16 20:18 05:25


 


Temperature 99.2 F  98.5 F


 


Pulse Rate 68  89


 


Respiratory 18 18 20





Rate   


 


Blood Pressure   


 


Blood Pressure 99/67  100/65





[Right]   


 


O2 Sat by Pulse 99  100





Oximetry   














  04/26/17 04/26/17 04/27/17





  09:32 20:17 13:02


 


Temperature 97.7 F 98.2 F 


 


Pulse Rate 56 L 63 


 


Respiratory 20 16 18





Rate   


 


Blood Pressure   


 


Blood Pressure 100/50 103/62 





[Right]   


 


O2 Sat by Pulse 100 100 100





Oximetry   














  04/27/17 04/27/17 04/28/17





  16:32 19:00 10:40


 


Temperature 97.8 F 98 F 98.6 F


 


Pulse Rate 62 76 100 H


 


Respiratory 18 16 18





Rate   


 


Blood Pressure   


 


Blood Pressure 106/67 108/68 116/69





[Right]   


 


O2 Sat by Pulse 99 100 98





Oximetry

## 2017-04-28 NOTE — PROGRESS NOTE
Subjective





- Reason for Consult


Consult date: 04/28/17


Reason for consult: Psychiatry Follow-up





- Chief Complaint


Chief complaint: 


"I'm ready to be discharged"





This is a 27-year-old AA male. He presents to the ER complaining of suicidality 

or hallucinations. Today patient is calm and cooperative with linear thought 

process. He stated that he thought he was leaving yesterday. I explained to him 

why he didn't leave, and he stated, "I understand (no fault of the patient). 

Patient stated that he is ready to get his life together and be more 

responsible. He denies SI/HI's, AVH's, depression, poor appetite, or sleep 

disturbance. He reports no side effects from his current psy medications. 





Mental Status Exam





- Vital signs


 Last Vital Signs











Temp  98 F   04/27/17 19:00


 


Pulse  76   04/27/17 19:00


 


Resp  16   04/27/17 19:00


 


BP  108/68   04/27/17 19:00


 


Pulse Ox  100   04/27/17 19:00














- Exam


Narrative exam: 


MSE:





 Appearance: calm, cooperative 


 Behavior: good eye contact 


 Speech: regular rate and tone 


 Mood: "I feel pretty good" 


 Affect: flat


 Thought Process: linear  


 Thought Content: denies SI/HI's and AVH's


 Motor Activity: ambulatory 


 Cognition: A/Ox3


 Insight: fair


 Judgment: fair








Assessment and Plan


Impression: This is a 27-year-old AA male. He presents to the ER complaining of 

suicidality or hallucinations. Today patient is calm and cooperative with a 

linear thought process. He stated that he thought he was leaving yesterday. I 

explained to him why he didn't leave, and he stated, "I understand (no fault of 

the patient). Patient stated that he is ready to get his life together and be 

more responsible. Patient denies SI/HI's and AVH's.





Recommendation/Plan: Rescind 1013. Patient is seen outpatient (psy services) at 

[facility]. Also, [facility] information given to patient for outpatient psy 

services. Continue Cogentin 1 mg PO HS, Haldol 5 mg PO HS, Risperdal 5 mg PO HS

, and Depakote 500 mg PO BID. Safety contract completed with patient.

## 2017-04-30 NOTE — ED ELOPEMENT REVIEW
ED Pt Elopement review





- Results review


Lab results: 





 Laboratory Tests











  04/28/17 04/28/17 04/28/17





  19:51 19:51 19:51


 


WBC    11.5 H


 


RBC    5.14 H


 


Hgb    15.7 H


 


Hct    47.8 H


 


MCV    93


 


MCH    31


 


MCHC    33


 


RDW    13.2


 


Plt Count    245


 


Lymph % (Auto)    7.8 L


 


Mono % (Auto)    9.8 H


 


Eos % (Auto)    0.0


 


Baso % (Auto)    0.3


 


Lymph #    0.9 L


 


Mono #    1.1 H


 


Eos #    0.0


 


Baso #    0.0


 


Seg Neutrophils %    82.1 H


 


Seg Neutrophils #    9.4 H


 


Sodium  135 L  


 


Potassium  4.2  


 


Chloride  93.6 L  


 


Carbon Dioxide  21 L  


 


Anion Gap  25  


 


BUN  14  


 


Creatinine  1.3  


 


Estimated GFR  > 60  


 


BUN/Creatinine Ratio  10.76  


 


Glucose  121 H  


 


Calcium  10.2  


 


Urine Color   


 


Urine Turbidity   


 


Urine pH   


 


Ur Specific Gravity   


 


Urine Protein   


 


Urine Glucose (UA)   


 


Urine Ketones   


 


Urine Blood   


 


Urine Nitrite   


 


Urine Bilirubin   


 


Urine Urobilinogen   


 


Ur Leukocyte Esterase   


 


Urine WBC (Auto)   


 


Urine RBC (Auto)   


 


U Epithel Cells (Auto)   


 


Hyaline Casts   


 


Urine Mucus   


 


Urine Opiates Screen   


 


Urine Methadone Screen   


 


Ur Barbiturates Screen   


 


Ur Phencyclidine Scrn   


 


Ur Amphetamines Screen   


 


U Benzodiazepines Scrn   


 


Urine Cocaine Screen   


 


U Marijuana (THC) Screen   


 


Drugs of Abuse Note   


 


Plasma/Serum Alcohol   < 0.01 














  04/29/17 04/29/17





  00:11 00:11


 


WBC  


 


RBC  


 


Hgb  


 


Hct  


 


MCV  


 


MCH  


 


MCHC  


 


RDW  


 


Plt Count  


 


Lymph % (Auto)  


 


Mono % (Auto)  


 


Eos % (Auto)  


 


Baso % (Auto)  


 


Lymph #  


 


Mono #  


 


Eos #  


 


Baso #  


 


Seg Neutrophils %  


 


Seg Neutrophils #  


 


Sodium  


 


Potassium  


 


Chloride  


 


Carbon Dioxide  


 


Anion Gap  


 


BUN  


 


Creatinine  


 


Estimated GFR  


 


BUN/Creatinine Ratio  


 


Glucose  


 


Calcium  


 


Urine Color  Yue 


 


Urine Turbidity  Clear 


 


Urine pH  5.0 


 


Ur Specific Gravity  1.029 


 


Urine Protein  30 mg/dl 


 


Urine Glucose (UA)  Neg 


 


Urine Ketones  Tr 


 


Urine Blood  Neg 


 


Urine Nitrite  Neg 


 


Urine Bilirubin  Neg 


 


Urine Urobilinogen  < 2.0 


 


Ur Leukocyte Esterase  Neg 


 


Urine WBC (Auto)  4.0 


 


Urine RBC (Auto)  3.0 


 


U Epithel Cells (Auto)  1.0 


 


Hyaline Casts  7 


 


Urine Mucus  3+ 


 


Urine Opiates Screen   Presumptive negative


 


Urine Methadone Screen   Presumptive negative


 


Ur Barbiturates Screen   Presumptive negative


 


Ur Phencyclidine Scrn   Presumptive negative


 


Ur Amphetamines Screen   Presumptive negative


 


U Benzodiazepines Scrn   Presumptive negative


 


Urine Cocaine Screen   Presumptive negative


 


U Marijuana (THC) Screen   Presumptive positive


 


Drugs of Abuse Note   Disclamer


 


Plasma/Serum Alcohol  














- Call Back decision


Pt Call Back Decision: No action required

## 2017-05-30 NOTE — EMERGENCY DEPARTMENT REPORT
HPI





- General


Chief Complaint: Psych


Time Seen by Provider: 05/30/17 06:13





- HPI


HPI: 





This is a 27-year-old Afro-American male who presents to the emergency 

department via police from his group home with the complaint of wanting to harm 

himself.  Patient has a history of anxiety, schizophrenia and bipolar disorder, 

which is the reason for him having residence at a behavioral group home.  While 

the patient does admit to still having thoughts of harming himself secondary to 

auditory hallucinations, he is not very forthcoming at this time with his 

complaints or answered questions.  However he says that he does not have his 

explicit plan as to how he would harm himself.  He denies any homicidal 

ideations.  He has a past medical history of hypertension.  There is a 

medication list from April of this year that shows the patient to be on Cogentin

, Depakote, Haldol and Risperdal but I am unable to confirm these medications 

at this time.





ED Past Medical Hx





- Past Medical History


Previous Medical History?: Yes


Hx Hypertension: Yes


Hx Psychiatric Treatment: Yes (anxiety, schizophrenia, bipolar)





- Surgical History


Past Surgical History?: No





- Social History


Smoking Status: Never Smoker





- Medications


Home Medications: 


 Home Medications











 Medication  Instructions  Recorded  Confirmed  Last Taken  Type


 


Benztropine [Cogentin] 1 mg PO QHS #30 tablet 04/28/17 05/30/17 Unknown Rx


 


Divalproex Dr [Depakote Dr] 500 mg PO BID #60 tablet 04/28/17 05/30/17 Unknown 

Rx


 


Haloperidol [Haldol] 5 mg PO HS #30 tablet 04/28/17 05/30/17 Unknown Rx


 


risperiDONE [RisperDAL] 5 mg PO QHS #30 tablet 04/28/17 05/30/17 Unknown Rx














ED Review of Systems


ROS: 


Stated complaint: MH EVAL


Other details as noted in HPI





Comment: All other systems reviewed and negative


Constitutional: denies: chills, fever


Eyes: denies: eye pain, eye discharge, vision change


ENT: denies: ear pain, throat pain


Respiratory: denies: cough, shortness of breath, wheezing


Cardiovascular: denies: chest pain, palpitations


Gastrointestinal: denies: abdominal pain, nausea, diarrhea


Genitourinary: denies: urgency, dysuria


Musculoskeletal: denies: back pain, joint swelling, arthralgia


Skin: denies: rash, lesions


Neurological: denies: headache, weakness, paresthesias


Psychiatric: auditory hallucinations, suicidal thoughts.  denies: visual 

hallucinations, homicidal thoughts





Physical Exam





- Physical Exam


Vital Signs: 


 Vital Signs











  05/30/17 05/30/17 05/30/17





  02:48 04:08 06:07


 


Temperature 97.8 F  


 


Pulse Rate 73 78 75


 


Respiratory 18 20 18





Rate   


 


Blood Pressure 107/85  


 


Blood Pressure  103/62 110/68





[Left]   


 


O2 Sat by Pulse 100 100 100





Oximetry   











Physical Exam: 


GENERAL: The patient is well-developed well-nourished.


HEENT: Normocephalic.  Atraumatic.  Extraocular motions are intact.  Patient 

has moist mucous membranes.  Pupils equal reactive to light bilaterally.


NECK: Supple.  Trachea is midline.


CHEST/LUNGS: Clear to auscultation.  There is no respiratory distress noted.


HEART/CARDIOVASCULAR: Regular.  There is no tachycardia.  There is no gallop 

rub or murmur.


ABDOMEN: Abdomen is soft, nontender.  Patient has normal bowel sounds.  There 

is no abdominal distention.


SKIN: There is no rash.  There is no edema.  There is no diaphoresis.


NEURO: The patient is awake, alert, and oriented.  The patient is cooperative.  

The patient has no focal neurologic deficits.  


MUSCULOSKELETAL: There is no tenderness or deformity.  There is no limitation 

range of motion.  There is no evidence of acute injury.


PSYCH: Patient has a flat affect.








ED Course


 Vital Signs











  05/30/17 05/30/17 05/30/17





  02:48 04:08 06:07


 


Temperature 97.8 F  


 


Pulse Rate 73 78 75


 


Respiratory 18 20 18





Rate   


 


Blood Pressure 107/85  


 


Blood Pressure  103/62 110/68





[Left]   


 


O2 Sat by Pulse 100 100 100





Oximetry   














ED Medical Decision Making





- Lab Data


Result diagrams: 


 05/30/17 02:58





 05/30/17 02:58





- Medical Decision Making


This is a 27-year-old male who presents via police from his group home with the 

complaint of auditory hallucinations that make him want to harm himself.  He 

does not have a specific plan.  The patient is not very forthcoming and has a 

flat affect.  For the reasons of self-harm versus suicidal ideations, the 

patient has been made a 1013.  His labs are unremarkable and do not show any 

etiology of the patient's symptoms.  Vital signs stable throughout his ED 

course.  The patient appears medically stable/cleared for psychiatric placement.








- Differential Diagnosis


schizophrenia, schizoaffective, bipolar, depression


Critical Care Time: No


Critical care attestation.: 


If time is entered above; I have spent that time in minutes in the direct care 

of this critically ill patient, excluding procedure time.








ED Disposition


Clinical Impression: 


 Suicidal ideations





Schizophrenia


Qualifiers:


 Schizophrenia type: unspecified Qualified Code(s): F20.9 - Schizophrenia, 

unspecified





Bipolar disorder


Qualifiers:


 Active/Remission status: remission status unspecified Qualified Code(s): F31.9 

- Bipolar disorder, unspecified





Disposition: DC/TX PSY HOSP/PSY UNIT


Is pt being admited?: No


Condition: Stable


Referrals: 


PRIMARY CARE,MD [Primary Care Provider] - 3-5 Days


Time of Disposition: 07:32

## 2017-05-30 NOTE — CONSULTATION
History of Present Illness





- Reason for Consult


Consult date: 05/30/17


Reason for consult: Mental Health Evaluation


Requesting physician: TUSHAR OMRTON





- Chief Complaint


Chief complaint: 


"I don't feel well"








- History of Present Psychiatric Illness


This is a 27-year-old Afro-American male who presents to the emergency 

department via police from his group home with the complaint of wanting to harm 

himself. This is a patient known to me. Today patient is calm and cooperative, 

with a tangential thought process. He stated that he felt suicidal yesterday, 

but denies those thoughts today. He denies abuse or bullying at his place of 

residence "[place]." During our conversation patient had poor eye contact and 

delayed responses. Patient could be responding to internal stimuli. He is 

disorganized with his thought content. He admit to taking his psy medications (

Risperdal, Haldol, Cogentin, and Depakote). Patient had to be redirected 

several times during our conversation. He denies SI/HI's, AVH's and depression 

symptoms. He denies recreational drug use or alcohol consumption (etoh). This 

is the same type of bizarre behavior that he presented with on his previous 

admission. 








Medications and Allergies


 Allergies











Allergy/AdvReac Type Severity Reaction Status Date / Time


 


quetiapine fumarate Allergy  Unknown Verified 02/19/17 07:33





[From Seroquel]     











 Home Medications











 Medication  Instructions  Recorded  Confirmed  Last Taken  Type


 


Benztropine [Cogentin] 1 mg PO QHS #30 tablet 04/28/17 05/30/17 Unknown Rx


 


Divalproex Dr [Depakote Dr] 500 mg PO BID #60 tablet 04/28/17 05/30/17 Unknown 

Rx


 


Haloperidol [Haldol] 5 mg PO HS #30 tablet 04/28/17 05/30/17 Unknown Rx


 


risperiDONE [RisperDAL] 5 mg PO QHS #30 tablet 04/28/17 05/30/17 Unknown Rx














Past psychiatric history





- Past Medical History


Past Medical History: No medical history


Past Surgical History: No surgical history





- past Psychiatric treatment and history


Psych: Bipolar, Schizophrenia


psychiatric treatment history: 


Multiple psy inpatient settings. Denies fam psy hx.








- Social History


Social history: other (Live at [facility])





Mental Status Exam





- Vital signs


 Last Vital Signs











Temp  98.5 F   05/30/17 11:37


 


Pulse  63   05/30/17 11:37


 


Resp  16   05/30/17 11:37


 


BP  110/61   05/30/17 11:37


 


Pulse Ox  98   05/30/17 11:37














- Exam


Narrative exam: 


ROS: (+) disorganized, (-) depression





MSE;





 Appearance: calm, cooperative


 Behavior: poor eye contact 


 Speech: regular rate and tone 


 Mood: "okay" 


 Affect: congruent to mood


 Thought Process: tangential


 Thought Content: no gestures of SI/HI's and AVH's


 Motor Activity: lying in bed


 Cognition: a/ox 3


 Insight: limited


 Judgment: limited








Results


Result Diagrams: 


 05/30/17 02:58





 05/30/17 02:58


 Abnormal lab results











  05/30/17 05/30/17 Range/Units





  02:52 02:58 


 


BUN   6 L  (9-20)  mg/dL


 


Glucose   109 H  ()  mg/dL


 


Valproic Acid  17.6 L   ()  ug/mL








All other labs normal.








Assessment and Plan


Assessment and plan: 


Impression: Unspecified Mood DO. This is a 27-year-old Afro-American male who 

presents to the emergency department via police from his group home with the 

complaint of wanting to harm himself. This is a patient known to me. Today 

patient is calm and cooperative, with a tangential thought process. He stated 

that he felt suicidal yesterday, but denies those thoughts today. He denies 

abuse or bullying at his place of residence [place]. During our conversation 

patient had poor eye contact and delayed responses. Patient could be responding 

to internal stimuli. He is disorganized with his thought content. VA 17.6.





DD: R/O Bipolar, Schizoaffective DO





Recommendation/Plan: Continue 1013 with pending placement to inpatient psy 

services.

## 2017-06-19 NOTE — CONSULTATION
History of Present Illness





- Reason for Consult


Reason for consult: auditory hallucinations





Medications and Allergies


 Allergies











Allergy/AdvReac Type Severity Reaction Status Date / Time


 


quetiapine fumarate Allergy  Unknown Verified 02/19/17 07:33





[From Seroquel]     











 Home Medications











 Medication  Instructions  Recorded  Confirmed  Last Taken  Type


 


Benztropine [Cogentin] 1 mg PO QHS #30 tablet 04/28/17 06/19/17 Unknown Rx


 


Divalproex Dr [Depakote Dr] 500 mg PO BID #60 tablet 04/28/17 06/19/17 Unknown 

Rx


 


Haloperidol [Haldol] 5 mg PO HS #30 tablet 04/28/17 06/19/17 Unknown Rx


 


risperiDONE [RisperDAL] 5 mg PO QHS #30 tablet 04/28/17 06/19/17 Unknown Rx











Active Meds: 


Active Medications





Benztropine Mesylate (Cogentin)  1 mg PO QHS JE


Haloperidol (Haldol)  5 mg PO HS JE


Lorazepam (Ativan)  2 mg IM Q4HR PRN


   PRN Reason: Agitation


Risperidone (Risperdal)  3 mg PO QHS JE


Risperidone (Risperdal)  2 mg PO QHS JE











Mental Status Exam





- Vital signs


 Last Vital Signs











Temp  98.0 F   06/19/17 09:11


 


Pulse  61   06/19/17 09:11


 


Resp  20   06/19/17 09:11


 


BP  109/65   06/19/17 09:11


 


Pulse Ox  99   06/19/17 09:11














Results


Result Diagrams: 


 06/19/17 01:52





 06/19/17 01:52


 Abnormal lab results











  06/19/17 06/19/17 06/19/17 Range/Units





  01:52 01:52 02:10 


 


Lymph % (Auto)  35.8 H    (13.4-35.0)  %


 


Mono % (Auto)  7.5 H    (0.0-7.3)  %


 


Sodium   135 L   (137-145)  mmol/L


 


Chloride   97.5 L   ()  mmol/L


 


BUN   7 L   (9-20)  mg/dL


 


Total Creatine Kinase    311 H  ()  units/L


 


Salicylates     (2.8-20.0)  mg/dL


 


Valproic Acid     ()  ug/mL














  06/19/17 06/19/17 Range/Units





  02:10 02:10 


 


Lymph % (Auto)    (13.4-35.0)  %


 


Mono % (Auto)    (0.0-7.3)  %


 


Sodium    (137-145)  mmol/L


 


Chloride    ()  mmol/L


 


BUN    (9-20)  mg/dL


 


Total Creatine Kinase    ()  units/L


 


Salicylates  < 0.3 L   (2.8-20.0)  mg/dL


 


Valproic Acid   < 2.8 L  ()  ug/mL








All other labs normal.








Assessment and Plan


Assessment and plan: 





CHIEF COMPLAINT IN PATIENTS WORDS: 








HISTORY OF PRESENT ILLNESS REQUIRING ADMISSION TO INPATIENT LEVEL OF CARE: 


(Describe the onset of Illness, Intensity of Symptoms, and Circumstances 

Leading to Admission)





This is a 27 year-old domiciled male who reports a formal PPH schizophrenia now 

presenting after patient's been having increasingly bizarre behaviors and 

command auditory hallucinations for the past few days.  On examination, he was 

sedate and mostly nonverbal.  Patient was a poor historian.  On review of 

clinical record, patient was reportedly having command auditory hallucinations 

and suicidal thoughts.  Patient reports he's been noncompliant with his 

medications.  Duration unknown





PSYCHIATRIC REVIEW OF SYSTEMS:





Substance: none noted


Depression: Some sleep difficulties, and mood lability


Angle: Labile moods, flight of ideas and impulsive behaviors some grandiosity 

noted


Psychosis: Disorganized and paranoid


Anxiety/ OCD/ PTSD: Unable To assess


Suicidality: denies SI


Other Self-Injurious Behavior: none currently, no SIB noted recently


Violent/ Aggressive Behavior: Recent aggression at the group home





CURRENT MEDICATIONS:  ( Psychiatric and Non-psychiatric )





Depakote 500 milligrams at bedtime


Depakote 500 mg every morning


Cogentin 1 mg at bedtime


Risperidone 5 mg qhs


Haloperidol 5 mg po qhs 





ALLERGIES:





Seroquel





PAST PSYCHIATRIC HISTORY:  ( Prior Treatment, Precipitating Factors, Diagnosis, 

and Course of Treatment  )





Inpatient: Multiple inpatient hospitalizations


Outpatient: unknown


Prior Suicide Attempts: Unknown


Prior Self-Injurious Behaviors: Unknown





PAST PSYCHIATRIC MEDICATION TRIALS:


Unknown





MEDICAL HISTORY:  (Chronic and Acute Illnesses, Current Medical Treatment, 

Recent Hospitalizations)


Denies








Detoxification / Withdrawal: 


none noted








MENTAL STATUS EXAM:


General Appearance: Dressed in hospital gown


Sensorium/Consciousness: alert and responding to external stimuli


Eye Contact: limited


Attitude / Behavior: uncooperative


Psychomotor &


Musculoskeletal Activity: PMR


Mood: Unable to assess


Affect: Flat


Speech / Language: Mute


Thought Processes: Disorganized


Thought Content: Positive for SI, no HI


Perception: Likely responding to internal stimuli, and reports age


Orientation:  person


Judgment  What would you do if you smelled smoke in a crowded movie theater?: 

poor/impulsive


Insight: poor


Intelligence  Vocabulary, general fund of knowledge, educational level: Below 

Average


Capacity of  ADLs:  Independent





STRENGTHS:








PSYCHOSOCIAL AND ENVIRONMENTAL STRESSORS:











ADMITTING DIAGNOSES 





Psychiatric:


Schizophrenia





Evidence for the following:





Medical:


n/a





INITIAL PLAN OF CARE AND TREATMENT GOALS:


Start Depakote


Start risperidone


Start Cogentin


Start Haldol


Refer for inpatient psychiatric care

## 2017-06-19 NOTE — EMERGENCY DEPARTMENT REPORT
ED Psych HPI





- General


Chief Complaint: Psych


Stated Complaint: BIPOLAR/CHRONIC DEPRESSION/HEARING VOICES/SCHIZOPH


Time Seen by Provider: 06/19/17 03:21


Source: patient, RN notes reviewed, old records reviewed


Mode of arrival: Ambulatory


Limitations: No Limitations





- History of Present Illness


Initial Comments: 


This is a 27-year-old male.  I have evaluated him in the past.  He has a past 

medical history of psychiatric disease/psychosis.  He presents to the ER 

complaining of hallucinations.  He indicated that he wants to hurt himself to 

the triage nurse.  He denies this complaint.  He denies headache, neck pain, 

chest pain, abdominal pain or shortness of breath.  He denies irritative and 

obstructive urinary symptoms.  He denies intentional overdose.  He denies 

access to guns firearms.  He cannot describe any exacerbating or relieving 

factors.














MD Complaint: other


-: Gradual


Associated Psychiatric Symptoms: suicidal ideation, auditory hallucinations


History of same: Yes


Quality: intermittent


Improves With: none


Worsens With: none


Associated Symptoms: denies other symptoms


Treatments Prior to Arrival: none





- Related Data


 Previous Rx's











 Medication  Instructions  Recorded  Last Taken  Type


 


Benztropine [Cogentin] 1 mg PO QHS #30 tablet 04/28/17 Unknown Rx


 


Divalproex Dr [Depakote Dr] 500 mg PO BID #60 tablet 04/28/17 Unknown Rx


 


Haloperidol [Haldol] 5 mg PO HS #30 tablet 04/28/17 Unknown Rx


 


risperiDONE [RisperDAL] 5 mg PO QHS #30 tablet 04/28/17 Unknown Rx











 Allergies











Allergy/AdvReac Type Severity Reaction Status Date / Time


 


quetiapine fumarate Allergy  Unknown Verified 02/19/17 07:33





[From Seroquel]     














ED Review of Systems


ROS: 


Stated complaint: BIPOLAR/CHRONIC DEPRESSION/HEARING VOICES/SCHIZOPH


Other details as noted in HPI





Constitutional: denies: fever, malaise


Eyes: denies: vision change


ENT: denies: epistaxis


Respiratory: denies: cough


Cardiovascular: denies: chest pain


Gastrointestinal: denies: abdominal pain


Genitourinary: as per HPI.  denies: dysuria


Musculoskeletal: as per HPI


Neurological: denies: weakness


Psychiatric: denies: suicidal thoughts





ED Past Medical Hx





- Past Medical History


Hx Hypertension: Yes


Hx Psychiatric Treatment: Yes (anxiety, schizophrenia, bipolar)





- Surgical History


Past Surgical History?: No





- Social History


Smoking Status: Current Every Day Smoker


Substance Use Type: None





- Medications


Home Medications: 


 Home Medications











 Medication  Instructions  Recorded  Confirmed  Last Taken  Type


 


Benztropine [Cogentin] 1 mg PO QHS #30 tablet 04/28/17 06/19/17 Unknown Rx


 


Divalproex Dr [Depakote Dr] 500 mg PO BID #60 tablet 04/28/17 06/19/17 Unknown 

Rx


 


Haloperidol [Haldol] 5 mg PO HS #30 tablet 04/28/17 06/19/17 Unknown Rx


 


risperiDONE [RisperDAL] 5 mg PO QHS #30 tablet 04/28/17 06/19/17 Unknown Rx














ED Physical Exam





- General


Limitations: No Limitations


General appearance: alert, in no apparent distress





- Head


Head exam: Present: atraumatic, normocephalic





- Eye


Eye exam: Present: normal appearance, EOMI.  Absent: nystagmus





- ENT


ENT exam: Present: normal exam, normal orophraynx, mucous membranes moist, 

normal external ear exam





- Neck


Neck exam: Present: normal inspection, full ROM.  Absent: tenderness, 

meningismus





- Respiratory


Respiratory exam: Present: normal lung sounds bilaterally.  Absent: respiratory 

distress, wheezes, rales, rhonchi, stridor, chest wall tenderness, accessory 

muscle use, decreased breath sounds, prolonged expiratory





- Cardiovascular


Cardiovascular Exam: Present: regular rate, normal rhythm, normal heart sounds.

  Absent: bradycardia, tachycardia, irregular rhythm, systolic murmur, 

diastolic murmur, rubs, gallop





- GI/Abdominal


GI/Abdominal exam: Present: soft, normal bowel sounds.  Absent: distended, 

tenderness, guarding, rebound, rigid, pulsatile mass





- Rectal


Rectal exam: Present: deferred





- Extremities Exam


Extremities exam: Present: normal inspection, full ROM, normal capillary 

refill.  Absent: tenderness, pedal edema, joint swelling, calf tenderness





- Back Exam


Back exam: Present: normal inspection, full ROM.  Absent: tenderness, CVA 

tenderness (R), CVA tenderness (L), muscle spasm, paraspinal tenderness, 

vertebral tenderness





- Neurological Exam


Neurological exam: Present: alert, oriented X3, normal gait, other (Extraocular 

movements intact.  Tongue midline.  No facial droop.  Facial sensation intact 

to light touch in the V1, V2, V3 distribution bilaterally.  5 and 5 strength in 

4 extremities..  Sensation is intact to light touch in 4 extremities.).  Absent

: motor sensory deficit





- Psychiatric


Psychiatric exam: Present: normal affect, normal mood





- Skin


Skin exam: Present: warm, dry, intact, normal color.  Absent: rash





ED Course


 Vital Signs











  06/19/17 06/19/17





  00:57 01:31


 


Temperature 98.7 F 


 


Pulse Rate 66 


 


Respiratory 18 18





Rate  


 


Blood Pressure 120/73 


 


Blood Pressure 120/73 





[Right]  


 


O2 Sat by Pulse 100 100





Oximetry  














- Reevaluation(s)


Reevaluation #1: 





06/19/17 04:55 differential diagnosis: Mood disorder, psychosis, chronic 

hallucinations, medical clearance for psychiatric placement





Assessment and plan: 27-year-old male with reported history of hallucinations 

and suicidality.  


 to me, the patient denies suicidality.  However, I have evaluated him the 

past.  Given his report of suicidality to the triage nurse, he is placed on a 

1013.  His laboratory studies are unremarkable.  We will continue his 

medications.  At this point in time, there is no immediate medical 

contraindication to psychiatric admission/evaluation/consultation.  Crisis team 

was informed.


























ED Medical Decision Making





- Lab Data


Result diagrams: 


 06/19/17 01:52





 06/19/17 01:52








 Vital Signs











  06/19/17 06/19/17





  00:57 01:31


 


Temperature 98.7 F 


 


Pulse Rate 66 


 


Respiratory 18 18





Rate  


 


Blood Pressure 120/73 


 


Blood Pressure 120/73 





[Right]  


 


O2 Sat by Pulse 100 100





Oximetry  














 Lab Results











  06/19/17 06/19/17 06/19/17 Range/Units





  01:45 01:45 01:52 


 


WBC    7.5  (4.5-11.0)  K/mm3


 


RBC    4.48  (3.65-5.03)  M/mm3


 


Hgb    13.8  (11.8-15.2)  gm/dl


 


Hct    40.4  (35.5-45.6)  %


 


MCV    90  (84-94)  fl


 


MCH    31  (28-32)  pg


 


MCHC    34  (32-34)  %


 


RDW    15.2  (13.2-15.2)  %


 


Plt Count    248  (140-440)  K/mm3


 


Lymph % (Auto)    35.8 H  (13.4-35.0)  %


 


Mono % (Auto)    7.5 H  (0.0-7.3)  %


 


Eos % (Auto)    3.4  (0.0-4.3)  %


 


Baso % (Auto)    0.7  (0.0-1.8)  %


 


Lymph #    2.7  (1.2-5.4)  K/mm3


 


Mono #    0.6  (0.0-0.8)  K/mm3


 


Eos #    0.3  (0.0-0.4)  K/mm3


 


Baso #    0.0  (0.0-0.1)  K/mm3


 


Seg Neutrophils %    52.6  (40.0-70.0)  %


 


Seg Neutrophils #    4.0  (1.8-7.7)  K/mm3


 


Sodium     (137-145)  mmol/L


 


Potassium     (3.6-5.0)  mmol/L


 


Chloride     ()  mmol/L


 


Carbon Dioxide     (22-30)  mmol/L


 


Anion Gap     mmol/L


 


BUN     (9-20)  mg/dL


 


Creatinine     (0.8-1.5)  mg/dL


 


Estimated GFR     ml/min


 


BUN/Creatinine Ratio     %


 


Glucose     ()  mg/dL


 


Calcium     (8.4-10.2)  mg/dL


 


Total Bilirubin     (0.1-1.2)  mg/dL


 


AST     (5-40)  units/L


 


ALT     (7-56)  units/L


 


Alkaline Phosphatase     ()  units/L


 


Total Creatine Kinase     ()  units/L


 


Total Protein     (6.3-8.2)  g/dL


 


Albumin     (3.9-5)  g/dL


 


Albumin/Globulin Ratio     %


 


Urine Color  Yellow    (Yellow)  


 


Urine Turbidity  Clear    (Clear)  


 


Urine pH  5.0    (5.0-7.0)  


 


Ur Specific Gravity  1.016    (1.003-1.030)  


 


Urine Protein  <15 mg/dl    (Negative)  mg/dL


 


Urine Glucose (UA)  Neg    (Negative)  mg/dL


 


Urine Ketones  Neg    (Negative)  mg/dL


 


Urine Blood  Neg    (Negative)  


 


Urine Nitrite  Neg    (Negative)  


 


Urine Bilirubin  Neg    (Negative)  


 


Urine Urobilinogen  2.0    (<2.0)  mg/dL


 


Ur Leukocyte Esterase  Neg    (Negative)  


 


Urine WBC (Auto)  1.0    (0.0-6.0)  /HPF


 


Urine RBC (Auto)  < 1.0    (0.0-6.0)  /HPF


 


U Epithel Cells (Auto)  < 1.0    (0-13.0)  /HPF


 


Urine Mucus  Few    /HPF


 


Salicylates     (2.8-20.0)  mg/dL


 


Urine Opiates Screen   Presumptive negative   


 


Urine Methadone Screen   Presumptive negative   


 


Acetaminophen     (10.0-30.0)  ug/mL


 


Ur Barbiturates Screen   Presumptive negative   


 


Ur Phencyclidine Scrn   Presumptive negative   


 


Ur Amphetamines Screen   Presumptive negative   


 


U Benzodiazepines Scrn   Presumptive negative   


 


Urine Cocaine Screen   Presumptive negative   


 


U Marijuana (THC) Screen   Presumptive negative   


 


Drugs of Abuse Note   Disclamer   


 


Plasma/Serum Alcohol     (0-0.07)  gm%














  06/19/17 06/19/17 06/19/17 Range/Units





  01:52 01:52 02:10 


 


WBC     (4.5-11.0)  K/mm3


 


RBC     (3.65-5.03)  M/mm3


 


Hgb     (11.8-15.2)  gm/dl


 


Hct     (35.5-45.6)  %


 


MCV     (84-94)  fl


 


MCH     (28-32)  pg


 


MCHC     (32-34)  %


 


RDW     (13.2-15.2)  %


 


Plt Count     (140-440)  K/mm3


 


Lymph % (Auto)     (13.4-35.0)  %


 


Mono % (Auto)     (0.0-7.3)  %


 


Eos % (Auto)     (0.0-4.3)  %


 


Baso % (Auto)     (0.0-1.8)  %


 


Lymph #     (1.2-5.4)  K/mm3


 


Mono #     (0.0-0.8)  K/mm3


 


Eos #     (0.0-0.4)  K/mm3


 


Baso #     (0.0-0.1)  K/mm3


 


Seg Neutrophils %     (40.0-70.0)  %


 


Seg Neutrophils #     (1.8-7.7)  K/mm3


 


Sodium  135 L    (137-145)  mmol/L


 


Potassium  3.8    (3.6-5.0)  mmol/L


 


Chloride  97.5 L    ()  mmol/L


 


Carbon Dioxide  25    (22-30)  mmol/L


 


Anion Gap  16    mmol/L


 


BUN  7 L    (9-20)  mg/dL


 


Creatinine  0.9    (0.8-1.5)  mg/dL


 


Estimated GFR  > 60    ml/min


 


BUN/Creatinine Ratio  7.77    %


 


Glucose  91    ()  mg/dL


 


Calcium  9.1    (8.4-10.2)  mg/dL


 


Total Bilirubin  < 0.20    (0.1-1.2)  mg/dL


 


AST  13    (5-40)  units/L


 


ALT  7    (7-56)  units/L


 


Alkaline Phosphatase  81    ()  units/L


 


Total Creatine Kinase    311 H  ()  units/L


 


Total Protein  7.8    (6.3-8.2)  g/dL


 


Albumin  4.0    (3.9-5)  g/dL


 


Albumin/Globulin Ratio  1.1    %


 


Urine Color     (Yellow)  


 


Urine Turbidity     (Clear)  


 


Urine pH     (5.0-7.0)  


 


Ur Specific Gravity     (1.003-1.030)  


 


Urine Protein     (Negative)  mg/dL


 


Urine Glucose (UA)     (Negative)  mg/dL


 


Urine Ketones     (Negative)  mg/dL


 


Urine Blood     (Negative)  


 


Urine Nitrite     (Negative)  


 


Urine Bilirubin     (Negative)  


 


Urine Urobilinogen     (<2.0)  mg/dL


 


Ur Leukocyte Esterase     (Negative)  


 


Urine WBC (Auto)     (0.0-6.0)  /HPF


 


Urine RBC (Auto)     (0.0-6.0)  /HPF


 


U Epithel Cells (Auto)     (0-13.0)  /HPF


 


Urine Mucus     /HPF


 


Salicylates     (2.8-20.0)  mg/dL


 


Urine Opiates Screen     


 


Urine Methadone Screen     


 


Acetaminophen     (10.0-30.0)  ug/mL


 


Ur Barbiturates Screen     


 


Ur Phencyclidine Scrn     


 


Ur Amphetamines Screen     


 


U Benzodiazepines Scrn     


 


Urine Cocaine Screen     


 


U Marijuana (THC) Screen     


 


Drugs of Abuse Note     


 


Plasma/Serum Alcohol   < 0.01   (0-0.07)  gm%














  06/19/17 06/19/17 Range/Units





  02:10 02:10 


 


WBC    (4.5-11.0)  K/mm3


 


RBC    (3.65-5.03)  M/mm3


 


Hgb    (11.8-15.2)  gm/dl


 


Hct    (35.5-45.6)  %


 


MCV    (84-94)  fl


 


MCH    (28-32)  pg


 


MCHC    (32-34)  %


 


RDW    (13.2-15.2)  %


 


Plt Count    (140-440)  K/mm3


 


Lymph % (Auto)    (13.4-35.0)  %


 


Mono % (Auto)    (0.0-7.3)  %


 


Eos % (Auto)    (0.0-4.3)  %


 


Baso % (Auto)    (0.0-1.8)  %


 


Lymph #    (1.2-5.4)  K/mm3


 


Mono #    (0.0-0.8)  K/mm3


 


Eos #    (0.0-0.4)  K/mm3


 


Baso #    (0.0-0.1)  K/mm3


 


Seg Neutrophils %    (40.0-70.0)  %


 


Seg Neutrophils #    (1.8-7.7)  K/mm3


 


Sodium    (137-145)  mmol/L


 


Potassium    (3.6-5.0)  mmol/L


 


Chloride    ()  mmol/L


 


Carbon Dioxide    (22-30)  mmol/L


 


Anion Gap    mmol/L


 


BUN    (9-20)  mg/dL


 


Creatinine    (0.8-1.5)  mg/dL


 


Estimated GFR    ml/min


 


BUN/Creatinine Ratio    %


 


Glucose    ()  mg/dL


 


Calcium    (8.4-10.2)  mg/dL


 


Total Bilirubin    (0.1-1.2)  mg/dL


 


AST    (5-40)  units/L


 


ALT    (7-56)  units/L


 


Alkaline Phosphatase    ()  units/L


 


Total Creatine Kinase    ()  units/L


 


Total Protein    (6.3-8.2)  g/dL


 


Albumin    (3.9-5)  g/dL


 


Albumin/Globulin Ratio    %


 


Urine Color    (Yellow)  


 


Urine Turbidity    (Clear)  


 


Urine pH    (5.0-7.0)  


 


Ur Specific Gravity    (1.003-1.030)  


 


Urine Protein    (Negative)  mg/dL


 


Urine Glucose (UA)    (Negative)  mg/dL


 


Urine Ketones    (Negative)  mg/dL


 


Urine Blood    (Negative)  


 


Urine Nitrite    (Negative)  


 


Urine Bilirubin    (Negative)  


 


Urine Urobilinogen    (<2.0)  mg/dL


 


Ur Leukocyte Esterase    (Negative)  


 


Urine WBC (Auto)    (0.0-6.0)  /HPF


 


Urine RBC (Auto)    (0.0-6.0)  /HPF


 


U Epithel Cells (Auto)    (0-13.0)  /HPF


 


Urine Mucus    /HPF


 


Salicylates  < 0.3 L   (2.8-20.0)  mg/dL


 


Urine Opiates Screen    


 


Urine Methadone Screen    


 


Acetaminophen   < 15.0  (10.0-30.0)  ug/mL


 


Ur Barbiturates Screen    


 


Ur Phencyclidine Scrn    


 


Ur Amphetamines Screen    


 


U Benzodiazepines Scrn    


 


Urine Cocaine Screen    


 


U Marijuana (THC) Screen    


 


Drugs of Abuse Note    


 


Plasma/Serum Alcohol    (0-0.07)  gm%














Critical care attestation.: 


If time is entered above; I have spent that time in minutes in the direct care 

of this critically ill patient, excluding procedure time.








ED Disposition


Clinical Impression: 


 Mood disorder





Disposition: DC/TX-65 PSY HOSP/PSY UNIT


Is pt being admited?: No


Does the pt Need Aspirin: No


Condition: Good


Referrals: 


PRIMARY CARE,MD [Primary Care Provider] - 3-5 Days

## 2017-07-11 NOTE — ED ELOPEMENT REVIEW
ED Pt Elopement review





- Results review


Lab results: 





 Laboratory Tests











  07/09/17 07/09/17 07/09/17





  23:48 23:48 23:48


 


WBC    6.0


 


RBC    4.57


 


Hgb    14.2


 


Hct    41.9


 


MCV    92


 


MCH    31


 


MCHC    34


 


RDW    15.6 H


 


Plt Count    223


 


Lymph % (Auto)    23.4


 


Mono % (Auto)    7.9 H


 


Eos % (Auto)    3.5


 


Baso % (Auto)    0.6


 


Lymph #    1.4


 


Mono #    0.5


 


Eos #    0.2


 


Baso #    0.0


 


Seg Neutrophils %    64.6


 


Seg Neutrophils #    3.9


 


Sodium  139  


 


Potassium  4.1  


 


Chloride  98.5  


 


Carbon Dioxide  24  


 


Anion Gap  21  


 


BUN  5 L  


 


Creatinine  0.9  


 


Estimated GFR  > 60  


 


BUN/Creatinine Ratio  5.55  


 


Glucose  85  


 


Calcium  9.1  


 


Urine Color   


 


Urine Turbidity   


 


Urine pH   


 


Ur Specific Gravity   


 


Urine Protein   


 


Urine Glucose (UA)   


 


Urine Ketones   


 


Urine Blood   


 


Urine Nitrite   


 


Urine Bilirubin   


 


Urine Urobilinogen   


 


Ur Leukocyte Esterase   


 


Urine WBC (Auto)   


 


Urine RBC (Auto)   


 


U Epithel Cells (Auto)   


 


Urine Opiates Screen   


 


Urine Methadone Screen   


 


Ur Barbiturates Screen   


 


Ur Phencyclidine Scrn   


 


Ur Amphetamines Screen   


 


U Benzodiazepines Scrn   


 


Urine Cocaine Screen   


 


U Marijuana (THC) Screen   


 


Drugs of Abuse Note   


 


Plasma/Serum Alcohol   < 0.01 














  07/09/17 07/09/17





  Unknown Unknown


 


WBC  


 


RBC  


 


Hgb  


 


Hct  


 


MCV  


 


MCH  


 


MCHC  


 


RDW  


 


Plt Count  


 


Lymph % (Auto)  


 


Mono % (Auto)  


 


Eos % (Auto)  


 


Baso % (Auto)  


 


Lymph #  


 


Mono #  


 


Eos #  


 


Baso #  


 


Seg Neutrophils %  


 


Seg Neutrophils #  


 


Sodium  


 


Potassium  


 


Chloride  


 


Carbon Dioxide  


 


Anion Gap  


 


BUN  


 


Creatinine  


 


Estimated GFR  


 


BUN/Creatinine Ratio  


 


Glucose  


 


Calcium  


 


Urine Color  Straw 


 


Urine Turbidity  Clear 


 


Urine pH  6.0 


 


Ur Specific Gravity  1.005 


 


Urine Protein  <15 mg/dl 


 


Urine Glucose (UA)  Neg 


 


Urine Ketones  Neg 


 


Urine Blood  Neg 


 


Urine Nitrite  Neg 


 


Urine Bilirubin  Neg 


 


Urine Urobilinogen  < 2.0 


 


Ur Leukocyte Esterase  Tr 


 


Urine WBC (Auto)  2.0 


 


Urine RBC (Auto)  1.0 


 


U Epithel Cells (Auto)  1.0 


 


Urine Opiates Screen   Presumptive negative


 


Urine Methadone Screen   Presumptive negative


 


Ur Barbiturates Screen   Presumptive negative


 


Ur Phencyclidine Scrn   Presumptive negative


 


Ur Amphetamines Screen   Presumptive negative


 


U Benzodiazepines Scrn   Presumptive negative


 


Urine Cocaine Screen   Presumptive negative


 


U Marijuana (THC) Screen   Presumptive negative


 


Drugs of Abuse Note   Disclamer


 


Plasma/Serum Alcohol  














- Call Back decision


Pt Call Back Decision: No action required

## 2017-08-10 NOTE — EMERGENCY DEPARTMENT REPORT
ED Psych HPI





- General


Chief Complaint: Psych


Stated Complaint: MH EVAL/BIPOLAR


Time Seen by Provider: 08/10/17 18:29


Source: patient


Mode of arrival: Ambulatory





- History of Present Illness


MD Complaint: feels depressed, altered mental status


-: Gradual


Associated Psychiatric Symptoms: depression, racing thoughts, auditory 

hallucinations, delusions


History of same: Yes


Quality: constant


Improves With: none


Worsens With: none


Associated Symptoms: insomnia.  denies: confusion, headache, shortness of breath

, nausea, vomiting, syncope


Treatments Prior to Arrival: none





- Related Data


 Previous Rx's











 Medication  Instructions  Recorded  Last Taken  Type


 


Benztropine [Cogentin] 1 mg PO QHS #30 tablet 04/28/17 Unknown Rx


 


Divalproex Dr [Depakote Dr] 500 mg PO BID #60 tablet 04/28/17 Unknown Rx


 


Haloperidol [Haldol] 5 mg PO HS #30 tablet 04/28/17 Unknown Rx


 


risperiDONE [RisperDAL] 5 mg PO QHS #30 tablet 04/28/17 Unknown Rx











 Allergies











Allergy/AdvReac Type Severity Reaction Status Date / Time


 


quetiapine fumarate Allergy  Unknown Verified 02/19/17 07:33





[From Seroquel]     














ED Review of Systems


ROS: 


Stated complaint: MH EVAL/BIPOLAR


Other details as noted in HPI





Comment: All other systems reviewed and negative





ED Past Medical Hx





- Past Medical History


Previous Medical History?: Yes


Hx Hypertension: Yes


Hx Psychiatric Treatment: Yes (anxiety, schizophrenia, bipolar)





- Surgical History


Past Surgical History?: No





- Social History


Smoking Status: Current Every Day Smoker


Substance Use Type: Alcohol





- Medications


Home Medications: 


 Home Medications











 Medication  Instructions  Recorded  Confirmed  Last Taken  Type


 


Benztropine [Cogentin] 1 mg PO QHS #30 tablet 04/28/17 06/19/17 Unknown Rx


 


Divalproex Dr [Depakote Dr] 500 mg PO BID #60 tablet 04/28/17 06/19/17 Unknown 

Rx


 


Haloperidol [Haldol] 5 mg PO HS #30 tablet 04/28/17 06/19/17 Unknown Rx


 


risperiDONE [RisperDAL] 5 mg PO QHS #30 tablet 04/28/17 06/19/17 Unknown Rx














ED Physical Exam





- General


Limitations: No Limitations


General appearance: alert, in no apparent distress





- Head


Head exam: Present: atraumatic, normocephalic





- Eye


Eye exam: Present: normal appearance, PERRL, EOMI





- ENT


ENT exam: Present: mucous membranes moist





- Neck


Neck exam: Present: normal inspection





- Respiratory


Respiratory exam: Present: normal lung sounds bilaterally.  Absent: respiratory 

distress





- Cardiovascular


Cardiovascular Exam: Present: regular rate, normal rhythm.  Absent: systolic 

murmur, diastolic murmur, rubs, gallop





- GI/Abdominal


GI/Abdominal exam: Present: soft, normal bowel sounds





- Rectal


Rectal exam: Present: deferred





- Extremities Exam


Extremities exam: Present: normal inspection





- Back Exam


Back exam: Present: normal inspection





- Neurological Exam


Neurological exam: Present: alert, oriented X3





- Psychiatric


Psychiatric exam: Present: depressed.  Absent: anxious, flat affect, manic, 

homicidal ideation





- Skin


Skin exam: Present: warm, dry, intact, normal color.  Absent: rash





ED Course


 Vital Signs











  08/10/17 08/10/17





  18:11 19:23


 


Temperature 98.4 F 98.2 F


 


Pulse Rate 86 90


 


Respiratory 16 18





Rate  


 


Blood Pressure 115/74 


 


Blood Pressure  112/74





[Right]  


 


O2 Sat by Pulse 98 99





Oximetry  














ED Medical Decision Making





- Lab Data


Result diagrams: 


 08/10/17 18:53





 08/10/17 18:53





- Medical Decision Making





waiting for psych placement , labs negative , will need transfer for acute 

psychosis, 





he is stable and with no complaints at this time


Critical care attestation.: 


If time is entered above; I have spent that time in minutes in the direct care 

of this critically ill patient, excluding procedure time.








ED Disposition


Clinical Impression: 


 Psychosis, Bipolar 1 disorder





Disposition: DC/TX-65 PSY HOSP/PSY UNIT


Is pt being admited?: No


Does the pt Need Aspirin: No


Condition: Good


Referrals: 


PRIMARY CARE,MD [Primary Care Provider] - 3-5 Days


Time of Disposition: 20:03

## 2017-08-10 NOTE — EMERGENCY DEPARTMENT REPORT
ED Psych HPI





- General


Chief Complaint: Psych


Stated Complaint: MH EVAL/BIPOLAR


Time Seen by Provider: 08/10/17 18:29


Source: patient


Mode of arrival: Ambulatory





- History of Present Illness


Quality: constant


Improves With: none


Worsens With: none


Associated Symptoms: insomnia.  denies: confusion, headache, shortness of breath

, nausea, vomiting, syncope


Treatments Prior to Arrival: none





- Related Data


 Previous Rx's











 Medication  Instructions  Recorded  Last Taken  Type


 


Benztropine [Cogentin] 1 mg PO QHS #30 tablet 04/28/17 Unknown Rx


 


Divalproex  [Depakote Dr] 500 mg PO BID #60 tablet 04/28/17 Unknown Rx


 


Haloperidol [Haldol] 5 mg PO HS #30 tablet 04/28/17 Unknown Rx


 


risperiDONE [RisperDAL] 5 mg PO QHS #30 tablet 04/28/17 Unknown Rx











 Allergies











Allergy/AdvReac Type Severity Reaction Status Date / Time


 


quetiapine fumarate Allergy  Unknown Verified 02/19/17 07:33





[From Seroquel]     














ED Review of Systems


ROS: 


Stated complaint: MH EVAL/BIPOLAR


Other details as noted in HPI








ED Past Medical Hx





- Past Medical History


Previous Medical History?: Yes


Hx Hypertension: Yes


Hx Psychiatric Treatment: Yes (anxiety, schizophrenia, bipolar)





- Surgical History


Past Surgical History?: No





- Social History


Smoking Status: Current Every Day Smoker


Substance Use Type: Alcohol





- Medications


Home Medications: 


 Home Medications











 Medication  Instructions  Recorded  Confirmed  Last Taken  Type


 


Benztropine [Cogentin] 1 mg PO QHS #30 tablet 04/28/17 06/19/17 Unknown Rx


 


Divalproex  [Depakote Dr] 500 mg PO BID #60 tablet 04/28/17 06/19/17 Unknown 

Rx


 


Haloperidol [Haldol] 5 mg PO HS #30 tablet 04/28/17 06/19/17 Unknown Rx


 


risperiDONE [RisperDAL] 5 mg PO QHS #30 tablet 04/28/17 06/19/17 Unknown Rx














ED Physical Exam





- General


Limitations: No Limitations


General appearance: alert, in no apparent distress





ED Course





 Vital Signs











  08/10/17 08/10/17





  18:11 19:23


 


Temperature 98.4 F 98.2 F


 


Pulse Rate 86 90


 


Respiratory 16 18





Rate  


 


Blood Pressure 115/74 


 


Blood Pressure  112/74





[Right]  


 


O2 Sat by Pulse 98 99





Oximetry  














ED Medical Decision Making





- Lab Data


Result diagrams: 


 08/10/17 18:53





 08/10/17 18:53


Critical care attestation.: 


If time is entered above; I have spent that time in minutes in the direct care 

of this critically ill patient, excluding procedure time.








ED Disposition


Clinical Impression: 


 Bipolar 1 disorder





Disposition: DC-01 TO HOME OR SELFCARE


Is pt being admited?: No


Does the pt Need Aspirin: No


Condition: Good


Instructions:  Bipolar Disorder (ED)


Referrals: 


PRIMARY CARE,MD [Primary Care Provider] - 3-5 Days


Time of Disposition: 20:17

## 2019-01-04 ENCOUNTER — HOSPITAL ENCOUNTER (EMERGENCY)
Dept: HOSPITAL 5 - ED | Age: 29
Discharge: LEFT BEFORE DISCHARGE | End: 2019-01-04
Payer: MEDICAID

## 2019-01-04 DIAGNOSIS — R51: Primary | ICD-10-CM

## 2019-01-04 DIAGNOSIS — Z53.21: ICD-10-CM

## 2019-01-06 ENCOUNTER — HOSPITAL ENCOUNTER (EMERGENCY)
Dept: HOSPITAL 5 - ED | Age: 29
Discharge: LEFT BEFORE DISCHARGE | End: 2019-01-06
Payer: MEDICAID

## 2019-01-06 DIAGNOSIS — Z53.21: ICD-10-CM

## 2019-01-06 DIAGNOSIS — Z00.8: Primary | ICD-10-CM

## 2019-01-08 ENCOUNTER — HOSPITAL ENCOUNTER (EMERGENCY)
Dept: HOSPITAL 5 - ED | Age: 29
LOS: 1 days | Discharge: PSYCHIATRIC OR SUBSTANCE USE FACILITY | End: 2019-01-09
Payer: MEDICAID

## 2019-01-08 VITALS — DIASTOLIC BLOOD PRESSURE: 68 MMHG | SYSTOLIC BLOOD PRESSURE: 104 MMHG

## 2019-01-08 DIAGNOSIS — J45.909: ICD-10-CM

## 2019-01-08 DIAGNOSIS — F20.9: ICD-10-CM

## 2019-01-08 DIAGNOSIS — F31.9: ICD-10-CM

## 2019-01-08 DIAGNOSIS — R45.851: ICD-10-CM

## 2019-01-08 DIAGNOSIS — I10: ICD-10-CM

## 2019-01-08 DIAGNOSIS — F12.10: ICD-10-CM

## 2019-01-08 DIAGNOSIS — R44.0: Primary | ICD-10-CM

## 2019-01-08 DIAGNOSIS — Z79.899: ICD-10-CM

## 2019-01-08 DIAGNOSIS — Z88.8: ICD-10-CM

## 2019-01-08 LAB
BACTERIA #/AREA URNS HPF: (no result) /HPF
BASOPHILS # (AUTO): 0.1 K/MM3 (ref 0–0.1)
BASOPHILS NFR BLD AUTO: 0.8 % (ref 0–1.8)
BENZODIAZEPINES SCREEN,URINE: (no result)
BILIRUB UR QL STRIP: (no result)
BLOOD UR QL VISUAL: (no result)
BUN SERPL-MCNC: 8 MG/DL (ref 9–20)
BUN/CREAT SERPL: 8 %
CALCIUM SERPL-MCNC: 8.6 MG/DL (ref 8.4–10.2)
EOSINOPHIL # BLD AUTO: 0.2 K/MM3 (ref 0–0.4)
EOSINOPHIL NFR BLD AUTO: 1.8 % (ref 0–4.3)
HCT VFR BLD CALC: 39.8 % (ref 35.5–45.6)
HEMOLYSIS INDEX: 13
HGB BLD-MCNC: 13.6 GM/DL (ref 11.8–15.2)
LYMPHOCYTES # BLD AUTO: 2.3 K/MM3 (ref 1.2–5.4)
LYMPHOCYTES NFR BLD AUTO: 25.7 % (ref 13.4–35)
MCHC RBC AUTO-ENTMCNC: 34 % (ref 32–34)
MCV RBC AUTO: 95 FL (ref 84–94)
METHADONE SCREEN,URINE: (no result)
MONOCYTES # (AUTO): 0.7 K/MM3 (ref 0–0.8)
MONOCYTES % (AUTO): 8.4 % (ref 0–7.3)
OPIATE SCREEN,URINE: (no result)
PH UR STRIP: 6 [PH] (ref 5–7)
PLATELET # BLD: 216 K/MM3 (ref 140–440)
PROT UR STRIP-MCNC: (no result) MG/DL
RBC # BLD AUTO: 4.17 M/MM3 (ref 3.65–5.03)
RBC #/AREA URNS HPF: < 1 /HPF (ref 0–6)
UROBILINOGEN UR-MCNC: < 2 MG/DL (ref ?–2)
WBC #/AREA URNS HPF: 7 /HPF (ref 0–6)

## 2019-01-08 PROCEDURE — G0480 DRUG TEST DEF 1-7 CLASSES: HCPCS

## 2019-01-08 PROCEDURE — 80307 DRUG TEST PRSMV CHEM ANLYZR: CPT

## 2019-01-08 PROCEDURE — 99285 EMERGENCY DEPT VISIT HI MDM: CPT

## 2019-01-08 PROCEDURE — 36415 COLL VENOUS BLD VENIPUNCTURE: CPT

## 2019-01-08 PROCEDURE — 81001 URINALYSIS AUTO W/SCOPE: CPT

## 2019-01-08 PROCEDURE — 85025 COMPLETE CBC W/AUTO DIFF WBC: CPT

## 2019-01-08 PROCEDURE — 80320 DRUG SCREEN QUANTALCOHOLS: CPT

## 2019-01-08 PROCEDURE — 80048 BASIC METABOLIC PNL TOTAL CA: CPT

## 2019-01-08 NOTE — EMERGENCY DEPARTMENT REPORT
ED Psych HPI





- General


Chief Complaint: Psych


Stated Complaint: MH


Time Seen by Provider: 01/08/19 01:40


Source: patient, police


Mode of arrival: Ambulatory





- History of Present Illness


Initial Comments: 





Patient is a 28-year-old -American male who has a history of schizoph

zhou and bipolar disorder who is presenting with auditory hallucinations.  

Patient states he is here worsens telling him to kill himself.  Patient is 

trying to resist.  Patient denies any alcohol or drug abuse discharge.  Patient 

states to me that "flipping out Mona monitors on the other side were".  

Patient denies any homicidal ideations.





- Related Data


                                Home Medications











 Medication  Instructions  Recorded  Confirmed  Last Taken


 


FLUoxetine HCL [Prozac] 20 mg PO BID 12/19/18 01/08/19 Unknown


 


Sertraline HCl [Zoloft] 50 mg PO QHS 12/19/18 01/08/19 Unknown








                                  Previous Rx's











 Medication  Instructions  Recorded  Last Taken  Type


 


Benztropine [Cogentin] 1 mg PO QHS #30 tablet 04/28/17 Unknown Rx


 


Divalproex  [Depakote Dr] 500 mg PO BID #60 tablet 04/28/17 Unknown Rx


 


Haloperidol [Haldol] 5 mg PO HS #30 tablet 04/28/17 Unknown Rx


 


risperiDONE [RisperDAL] 5 mg PO QHS #30 tablet 04/28/17 Unknown Rx











                                    Allergies











Allergy/AdvReac Type Severity Reaction Status Date / Time


 


quetiapine fumarate Allergy  Unknown Verified 12/13/18 12:11





[From Seroquel]     














ED Review of Systems


ROS: 


Stated complaint: MH


Other details as noted in HPI





Comment: All other systems reviewed and negative





ED Past Medical Hx





- Past Medical History


Previous Medical History?: Yes


Hx Hypertension: Yes


Hx Seizures: Yes


Hx Psychiatric Treatment: Yes (anxiety, schizophrenia, bipolar)


Hx Asthma: Yes





- Surgical History


Past Surgical History?: No





- Social History


Smoking Status: Never Smoker


Substance Use Type: Alcohol, Marijuana





- Medications


Home Medications: 


                                Home Medications











 Medication  Instructions  Recorded  Confirmed  Last Taken  Type


 


Benztropine [Cogentin] 1 mg PO QHS #30 tablet 04/28/17 01/08/19 Unknown Rx


 


Divalproex  [Depakote Dr] 500 mg PO BID #60 tablet 04/28/17 01/08/19 Unknown 

Rx


 


Haloperidol [Haldol] 5 mg PO HS #30 tablet 04/28/17 01/08/19 Unknown Rx


 


risperiDONE [RisperDAL] 5 mg PO QHS #30 tablet 04/28/17 01/08/19 Unknown Rx


 


FLUoxetine HCL [Prozac] 20 mg PO BID 12/19/18 01/08/19 Unknown History


 


Sertraline HCl [Zoloft] 50 mg PO QHS 12/19/18 01/08/19 Unknown History














ED Physical Exam





- General


Limitations: No Limitations


General appearance: alert, in no apparent distress





- Head


Head exam: Present: atraumatic, normocephalic





- Eye


Eye exam: Present: normal appearance





- ENT


ENT exam: Present: mucous membranes moist





- Neck


Neck exam: Present: normal inspection





- Respiratory


Respiratory exam: Present: normal lung sounds bilaterally.  Absent: respiratory 

distress, wheezes, rales, rhonchi





- Cardiovascular


Cardiovascular Exam: Present: regular rate, normal rhythm.  Absent: systolic 

murmur, diastolic murmur, rubs, gallop





- GI/Abdominal


GI/Abdominal exam: Present: soft, normal bowel sounds.  Absent: distended, 

tenderness, guarding, rebound





- Rectal


Rectal exam: Present: deferred





- Extremities Exam


Extremities exam: Present: normal inspection





- Back Exam


Back exam: Present: normal inspection





- Neurological Exam


Neurological exam: Present: alert, oriented X3





- Psychiatric


Psychiatric exam: Present: normal affect, normal mood





- Skin


Skin exam: Present: warm, dry, intact, normal color.  Absent: rash





ED Medical Decision Making





- Lab Data


Result diagrams: 


                                 01/08/19 01:48





                                 01/08/19 01:48








                                   Lab Results











  01/08/19 01/08/19 01/08/19 Range/Units





  01:48 01:48 01:48 


 


WBC    8.9  (4.5-11.0)  K/mm3


 


RBC    4.17  (3.65-5.03)  M/mm3


 


Hgb    13.6  (11.8-15.2)  gm/dl


 


Hct    39.8  (35.5-45.6)  %


 


MCV    95 H  (84-94)  fl


 


MCH    33 H  (28-32)  pg


 


MCHC    34  (32-34)  %


 


RDW    14.6  (13.2-15.2)  %


 


Plt Count    216  (140-440)  K/mm3


 


Lymph % (Auto)    25.7  (13.4-35.0)  %


 


Mono % (Auto)    8.4 H  (0.0-7.3)  %


 


Eos % (Auto)    1.8  (0.0-4.3)  %


 


Baso % (Auto)    0.8  (0.0-1.8)  %


 


Lymph #    2.3  (1.2-5.4)  K/mm3


 


Mono #    0.7  (0.0-0.8)  K/mm3


 


Eos #    0.2  (0.0-0.4)  K/mm3


 


Baso #    0.1  (0.0-0.1)  K/mm3


 


Seg Neutrophils %    63.3  (40.0-70.0)  %


 


Seg Neutrophils #    5.6  (1.8-7.7)  K/mm3


 


Sodium   135 L   (137-145)  mmol/L


 


Potassium   3.3 L   (3.6-5.0)  mmol/L


 


Chloride   97.3 L   ()  mmol/L


 


Carbon Dioxide   25   (22-30)  mmol/L


 


Anion Gap   16   mmol/L


 


BUN   8 L   (9-20)  mg/dL


 


Creatinine   1.0   (0.8-1.5)  mg/dL


 


Estimated GFR   > 60   ml/min


 


BUN/Creatinine Ratio   8   %


 


Glucose   89   ()  mg/dL


 


Calcium   8.6   (8.4-10.2)  mg/dL


 


Acetaminophen  < 5.0 L    (10.0-30.0)  ug/mL














- Medical Decision Making





Patient is medically cleared at this time


Critical care attestation.: 


If time is entered above; I have spent that time in minutes in the direct care 

of this critically ill patient, excluding procedure time.








ED Disposition


Clinical Impression: 


 Suicidal ideations, Encounter for psychiatric assessment





Disposition: DC/TX-[id number] PSY HOSP/PSY UNIT


Is pt being admited?: No


Does the pt Need Aspirin: No


Condition: Stable


Referrals: 


PRIMARY CARE,MD [Primary Care Provider] - 3-5 Days

## 2019-01-08 NOTE — CONSULTATION
History of Present Illness





- Reason for Consult


Consult date: 01/08/19


Reason for consult: Mental Health Evaluation


Requesting physician: LYNETTE HE





- Chief Complaint


Chief complaint: 


"I don't want to talk"








- History of Present Psychiatric Illness


28 y.o. AA male who presented to the ER for AH's. Today the patient refused to 

talk during the assessment. He stated, 'Come back tomorrow." 








Medications and Allergies


                                    Allergies











Allergy/AdvReac Type Severity Reaction Status Date / Time


 


quetiapine fumarate Allergy  Unknown Verified 12/13/18 12:11





[From Seroquel]     











                                Home Medications











 Medication  Instructions  Recorded  Confirmed  Last Taken  Type


 


Benztropine [Cogentin] 1 mg PO QHS #30 tablet 04/28/17 01/08/19 Unknown Rx


 


Divalproex Dr [Depakote Dr] 500 mg PO BID #60 tablet 04/28/17 01/08/19 Unknown 

Rx


 


Haloperidol [Haldol] 5 mg PO HS #30 tablet 04/28/17 01/08/19 Unknown Rx


 


risperiDONE [RisperDAL] 5 mg PO QHS #30 tablet 04/28/17 01/08/19 Unknown Rx


 


FLUoxetine HCL [Prozac] 20 mg PO BID 12/19/18 01/08/19 Unknown History


 


Sertraline HCl [Zoloft] 50 mg PO QHS 12/19/18 01/08/19 Unknown History














Past psychiatric history





- Past Medical History


Past Medical History: other (Unable to obtain)


Past Surgical History: Other (Unabel to obtain)





- past Psychiatric treatment and history


psychiatric treatment history: 


Unable to obtain a psy hx and fam psy hx.








- Social History


Social history: other (Unable to obtain)





Mental Status Exam





- Vital signs


                                Last Vital Signs











Temp  97.8 F   01/08/19 08:08


 


Pulse  86   01/08/19 08:08


 


Resp  16   01/08/19 08:08


 


BP  121/72   01/08/19 08:08


 


Pulse Ox  99   01/08/19 08:08














- Exam


Narrative exam: 


Unable to complete the MSE because the patient refused to cooperate.








Results


Result Diagrams: 


                                 01/08/19 01:48





                                 01/08/19 01:48


                              Abnormal lab results











  01/08/19 01/08/19 01/08/19 Range/Units





  01:48 01:48 01:48 


 


MCV    95 H  (84-94)  fl


 


MCH    33 H  (28-32)  pg


 


Mono % (Auto)    8.4 H  (0.0-7.3)  %


 


Sodium   135 L   (137-145)  mmol/L


 


Potassium   3.3 L   (3.6-5.0)  mmol/L


 


Chloride   97.3 L   ()  mmol/L


 


BUN   8 L   (9-20)  mg/dL


 


Urine WBC (Auto)     (0.0-6.0)  /HPF


 


Acetaminophen  < 5.0 L    (10.0-30.0)  ug/mL














  01/08/19 Range/Units





  02:50 


 


MCV   (84-94)  fl


 


MCH   (28-32)  pg


 


Mono % (Auto)   (0.0-7.3)  %


 


Sodium   (137-145)  mmol/L


 


Potassium   (3.6-5.0)  mmol/L


 


Chloride   ()  mmol/L


 


BUN   (9-20)  mg/dL


 


Urine WBC (Auto)  7.0 H  (0.0-6.0)  /HPF


 


Acetaminophen   (10.0-30.0)  ug/mL








All other labs normal.








Assessment and Plan


Assessment and plan: 


Impression: Today the patient refused to talk during the assessment.





Recommendation/Plan: Continue 1013 and reassess the patient in 24 hours.





Dispo: The patient was referred to inpatient psy services.





Will staff with Dr JIN Eagle.

## 2019-03-08 ENCOUNTER — HOSPITAL ENCOUNTER (EMERGENCY)
Dept: HOSPITAL 5 - ED | Age: 29
Discharge: LEFT BEFORE DISCHARGE | End: 2019-03-08
Payer: MEDICAID

## 2019-03-08 DIAGNOSIS — Z53.21: ICD-10-CM

## 2019-03-08 DIAGNOSIS — Z11.3: Primary | ICD-10-CM

## 2019-03-08 NOTE — EMERGENCY DEPARTMENT REPORT
Chief Complaint: Medical Clearance


Stated Complaint: HIV TEST


Time Seen by Provider: 03/08/19 02:06





- HPI


History of Present Illness: 





29-year-old American male with a past medical history of mental health issues.  

Comes in requesting for HIV test.  When I instructed the patient that he will 

need to follow-up with health Department then he asked them the test and for 

diabetes.





- Exam


Physical Exam: 





Patient is alert and oriented with normal gait no acute distress





MSE screening note: 


Focused history and physical exam performed.


Due to findings the following was ordered:





Patient be referred to health department for HIV and STD checks and to community

clinic for diabetes screening.





ED Disposition for MSE


Condition: Stable

## 2019-04-28 ENCOUNTER — HOSPITAL ENCOUNTER (EMERGENCY)
Dept: HOSPITAL 5 - ED | Age: 29
Discharge: PSYCHIATRIC OR SUBSTANCE USE FACILITY | End: 2019-04-28
Payer: MEDICAID

## 2019-04-28 VITALS — SYSTOLIC BLOOD PRESSURE: 108 MMHG | DIASTOLIC BLOOD PRESSURE: 55 MMHG

## 2019-04-28 DIAGNOSIS — F41.9: ICD-10-CM

## 2019-04-28 DIAGNOSIS — I10: ICD-10-CM

## 2019-04-28 DIAGNOSIS — F31.9: ICD-10-CM

## 2019-04-28 DIAGNOSIS — F17.200: ICD-10-CM

## 2019-04-28 DIAGNOSIS — F23: Primary | ICD-10-CM

## 2019-04-28 DIAGNOSIS — Z88.8: ICD-10-CM

## 2019-04-28 DIAGNOSIS — F12.10: ICD-10-CM

## 2019-04-28 DIAGNOSIS — J45.909: ICD-10-CM

## 2019-04-28 LAB
BASOPHILS # (AUTO): 0 K/MM3 (ref 0–0.1)
BASOPHILS NFR BLD AUTO: 0.9 % (ref 0–1.8)
BENZODIAZEPINES SCREEN,URINE: (no result)
BILIRUB UR QL STRIP: (no result)
BLOOD UR QL VISUAL: (no result)
BUN SERPL-MCNC: 9 MG/DL (ref 9–20)
BUN/CREAT SERPL: 10 %
CALCIUM SERPL-MCNC: 9.1 MG/DL (ref 8.4–10.2)
EOSINOPHIL # BLD AUTO: 0.1 K/MM3 (ref 0–0.4)
EOSINOPHIL NFR BLD AUTO: 2.6 % (ref 0–4.3)
HCT VFR BLD CALC: 42.7 % (ref 35.5–45.6)
HEMOLYSIS INDEX: 13
HGB BLD-MCNC: 14.5 GM/DL (ref 11.8–15.2)
LYMPHOCYTES # BLD AUTO: 2.2 K/MM3 (ref 1.2–5.4)
LYMPHOCYTES NFR BLD AUTO: 43.8 % (ref 13.4–35)
MCHC RBC AUTO-ENTMCNC: 34 % (ref 32–34)
MCV RBC AUTO: 92 FL (ref 84–94)
METHADONE SCREEN,URINE: (no result)
MONOCYTES # (AUTO): 0.4 K/MM3 (ref 0–0.8)
MONOCYTES % (AUTO): 7.8 % (ref 0–7.3)
MUCOUS THREADS #/AREA URNS HPF: (no result) /HPF
OPIATE SCREEN,URINE: (no result)
PH UR STRIP: 5 [PH] (ref 5–7)
PLATELET # BLD: 216 K/MM3 (ref 140–440)
PROT UR STRIP-MCNC: (no result) MG/DL
RBC # BLD AUTO: 4.63 M/MM3 (ref 3.65–5.03)
RBC #/AREA URNS HPF: 2 /HPF (ref 0–6)
UROBILINOGEN UR-MCNC: 4 MG/DL (ref ?–2)
WBC #/AREA URNS HPF: 3 /HPF (ref 0–6)

## 2019-04-28 PROCEDURE — 80320 DRUG SCREEN QUANTALCOHOLS: CPT

## 2019-04-28 PROCEDURE — 36415 COLL VENOUS BLD VENIPUNCTURE: CPT

## 2019-04-28 PROCEDURE — G0480 DRUG TEST DEF 1-7 CLASSES: HCPCS

## 2019-04-28 PROCEDURE — 80048 BASIC METABOLIC PNL TOTAL CA: CPT

## 2019-04-28 PROCEDURE — 80164 ASSAY DIPROPYLACETIC ACD TOT: CPT

## 2019-04-28 PROCEDURE — 85025 COMPLETE CBC W/AUTO DIFF WBC: CPT

## 2019-04-28 PROCEDURE — 80307 DRUG TEST PRSMV CHEM ANLYZR: CPT

## 2019-04-28 PROCEDURE — 99285 EMERGENCY DEPT VISIT HI MDM: CPT

## 2019-04-28 PROCEDURE — 81001 URINALYSIS AUTO W/SCOPE: CPT

## 2019-04-28 NOTE — EMERGENCY DEPARTMENT REPORT
ED Psych HPI





- General


Chief Complaint: Psych


Stated Complaint: NOT FEELING WELL


Time Seen by Provider: 04/28/19 08:57


Source: patient


Mode of arrival: Ambulatory





- History of Present Illness


Initial Comments: 





Patient is 29 years old male with history of schizophrenia.  Patient presented 

to the ER stating that he is hearing voices asking him to blow his head.  

Patient denied any homicidal ideation.  Patient stated that he had history of 

suicidal attempt before but he does not want to elaborate.


MD Complaint: suicidal ideation, feels depressed


Associated Psychiatric Symptoms: depression, suicidal ideation, racing thoughts,

auditory hallucinations


Quality: constant


Associated Symptoms: denies other symptoms


Treatments Prior to Arrival: none


If Self Harm: admits thoughts of, has plan, self-inflicted trauma





- Related Data


                                Home Medications











 Medication  Instructions  Recorded  Confirmed  Last Taken


 


FLUoxetine HCL [Prozac] 20 mg PO BID 12/19/18 01/08/19 Unknown


 


Sertraline HCl [Zoloft] 50 mg PO QHS 12/19/18 01/08/19 Unknown








                                  Previous Rx's











 Medication  Instructions  Recorded  Last Taken  Type


 


Benztropine [Cogentin] 1 mg PO QHS #30 tablet 04/28/17 Unknown Rx


 


Divalproex Dr [Depakote Dr] 500 mg PO BID #60 tablet 04/28/17 Unknown Rx


 


Haloperidol [Haldol] 5 mg PO HS #30 tablet 04/28/17 Unknown Rx


 


risperiDONE [RisperDAL] 5 mg PO QHS #30 tablet 04/28/17 Unknown Rx











                                    Allergies











Allergy/AdvReac Type Severity Reaction Status Date / Time


 


quetiapine fumarate Allergy  Unknown Verified 04/28/19 08:46





[From Seroquel]     














ED Review of Systems


ROS: 


Stated complaint: NOT FEELING WELL


Other details as noted in HPI





Comment: All other systems reviewed and negative


Constitutional: denies: chills, fever


Respiratory: denies: shortness of breath, SOB with exertion, wheezing


Cardiovascular: denies: chest pain, palpitations, dyspnea on exertion


Gastrointestinal: denies: abdominal pain, nausea, vomiting, diarrhea, 

constipation, hematemesis, melena, hematochezia


Musculoskeletal: denies: back pain


Neurological: denies: headache, weakness, numbness, paresthesias, confusion, 

abnormal gait


Psychiatric: depression, auditory hallucinations, suicidal thoughts.  denies: 

visual hallucinations, homicidal thoughts





ED Past Medical Hx





- Past Medical History


Previous Medical History?: Yes


Hx Hypertension: Yes


Hx Seizures: Yes


Hx Psychiatric Treatment: Yes (anxiety, schizophrenia, bipolar)


Hx Asthma: Yes





- Social History


Smoking Status: Current Every Day Smoker


Substance Use Type: Alcohol, Marijuana





- Medications


Home Medications: 


                                Home Medications











 Medication  Instructions  Recorded  Confirmed  Last Taken  Type


 


Benztropine [Cogentin] 1 mg PO QHS #30 tablet 04/28/17 01/08/19 Unknown Rx


 


Divalproex Dr [Depakote Dr] 500 mg PO BID #60 tablet 04/28/17 01/08/19 Unknown 

Rx


 


Haloperidol [Haldol] 5 mg PO HS #30 tablet 04/28/17 01/08/19 Unknown Rx


 


risperiDONE [RisperDAL] 5 mg PO QHS #30 tablet 04/28/17 01/08/19 Unknown Rx


 


FLUoxetine HCL [Prozac] 20 mg PO BID 12/19/18 01/08/19 Unknown History


 


Sertraline HCl [Zoloft] 50 mg PO QHS 12/19/18 01/08/19 Unknown History














ED Physical Exam





- General


Limitations: No Limitations


General appearance: alert, in no apparent distress





- Head


Head exam: Present: atraumatic, normocephalic, normal inspection





- Eye


Eye exam: Present: normal appearance, PERRL





- ENT


ENT exam: Present: normal exam, normal orophraynx, mucous membranes moist





- Neck


Neck exam: Present: normal inspection, full ROM.  Absent: tenderness, meningism

us, lymphadenopathy, thyromegaly





- Respiratory


Respiratory exam: Present: normal lung sounds bilaterally





- Cardiovascular


Cardiovascular Exam: Present: regular rate, normal rhythm, normal heart sounds





- GI/Abdominal


GI/Abdominal exam: Present: soft, normal bowel sounds.  Absent: distended, 

tenderness, guarding, rebound, rigid





- Extremities Exam


Extremities exam: Present: normal inspection, full ROM, normal capillary refill





- Back Exam


Back exam: Present: normal inspection, full ROM.  Absent: CVA tenderness (R), 

CVA tenderness (L), muscle spasm, paraspinal tenderness, vertebral tenderness





- Neurological Exam


Neurological exam: Present: alert, oriented X3, CN II-XII intact, normal gait





- Psychiatric


Psychiatric exam: Present: depressed, suicidal ideation.  Absent: agitated, 

anxious, flat affect, manic, homicidal ideation





- Skin


Skin exam: Present: warm, dry, intact





ED Course





                                   Vital Signs











  04/28/19





  07:27


 


Temperature 98 F


 


Pulse Rate 81


 


Respiratory 18





Rate 


 


Blood Pressure 121/73


 


O2 Sat by Pulse 100





Oximetry 














ED Medical Decision Making





- Lab Data


Result diagrams: 


                                 04/28/19 07:38





                                 04/28/19 07:38


Critical care attestation.: 


If time is entered above; I have spent that time in minutes in the direct care 

of this critically ill patient, excluding procedure time.








ED Disposition


Clinical Impression: 


 Suicidal ideations, Acute psychosis





Disposition: DC/TX-[id number] PSY HOSP/PSY UNIT


Is pt being admited?: No


Condition: Stable


Referrals: 


PRIMARY CARE,MD [Primary Care Provider] - 3-5 Days

## 2019-08-22 ENCOUNTER — HOSPITAL ENCOUNTER (EMERGENCY)
Dept: HOSPITAL 5 - ED | Age: 29
LOS: 1 days | Discharge: PSYCHIATRIC OR SUBSTANCE USE FACILITY | End: 2019-08-23
Payer: MEDICAID

## 2019-08-22 DIAGNOSIS — F12.10: ICD-10-CM

## 2019-08-22 DIAGNOSIS — F17.200: ICD-10-CM

## 2019-08-22 DIAGNOSIS — Z79.899: ICD-10-CM

## 2019-08-22 DIAGNOSIS — F23: Primary | ICD-10-CM

## 2019-08-22 DIAGNOSIS — Z88.8: ICD-10-CM

## 2019-08-22 DIAGNOSIS — F31.9: ICD-10-CM

## 2019-08-22 DIAGNOSIS — F41.9: ICD-10-CM

## 2019-08-22 DIAGNOSIS — I10: ICD-10-CM

## 2019-08-22 DIAGNOSIS — J45.909: ICD-10-CM

## 2019-08-22 LAB
ALBUMIN SERPL-MCNC: 3.9 G/DL (ref 3.9–5)
ALT SERPL-CCNC: 5 UNITS/L (ref 7–56)
BASOPHILS # (AUTO): 0 K/MM3 (ref 0–0.1)
BASOPHILS NFR BLD AUTO: 0.5 % (ref 0–1.8)
BENZODIAZEPINES SCREEN,URINE: (no result)
BILIRUB UR QL STRIP: (no result)
BLOOD UR QL VISUAL: (no result)
BUN SERPL-MCNC: 5 MG/DL (ref 9–20)
BUN/CREAT SERPL: 6 %
CALCIUM SERPL-MCNC: 9.2 MG/DL (ref 8.4–10.2)
EOSINOPHIL # BLD AUTO: 0.1 K/MM3 (ref 0–0.4)
EOSINOPHIL NFR BLD AUTO: 1 % (ref 0–4.3)
HCT VFR BLD CALC: 39.8 % (ref 35.5–45.6)
HEMOLYSIS INDEX: 3
HGB BLD-MCNC: 13.7 GM/DL (ref 11.8–15.2)
LYMPHOCYTES # BLD AUTO: 1.9 K/MM3 (ref 1.2–5.4)
LYMPHOCYTES NFR BLD AUTO: 31 % (ref 13.4–35)
MCHC RBC AUTO-ENTMCNC: 34 % (ref 32–34)
MCV RBC AUTO: 94 FL (ref 84–94)
METHADONE SCREEN,URINE: (no result)
MONOCYTES # (AUTO): 0.6 K/MM3 (ref 0–0.8)
MONOCYTES % (AUTO): 10.3 % (ref 0–7.3)
MUCOUS THREADS #/AREA URNS HPF: (no result) /HPF
OPIATE SCREEN,URINE: (no result)
PH UR STRIP: 5 [PH] (ref 5–7)
PLATELET # BLD: 256 K/MM3 (ref 140–440)
PROT UR STRIP-MCNC: (no result) MG/DL
RBC # BLD AUTO: 4.22 M/MM3 (ref 3.65–5.03)
RBC #/AREA URNS HPF: 2 /HPF (ref 0–6)
UROBILINOGEN UR-MCNC: 2 MG/DL (ref ?–2)
WBC #/AREA URNS HPF: 9 /HPF (ref 0–6)

## 2019-08-22 PROCEDURE — 99285 EMERGENCY DEPT VISIT HI MDM: CPT

## 2019-08-22 PROCEDURE — 80053 COMPREHEN METABOLIC PANEL: CPT

## 2019-08-22 PROCEDURE — 80307 DRUG TEST PRSMV CHEM ANLYZR: CPT

## 2019-08-22 PROCEDURE — G0480 DRUG TEST DEF 1-7 CLASSES: HCPCS

## 2019-08-22 PROCEDURE — 36415 COLL VENOUS BLD VENIPUNCTURE: CPT

## 2019-08-22 PROCEDURE — 85025 COMPLETE CBC W/AUTO DIFF WBC: CPT

## 2019-08-22 PROCEDURE — 87086 URINE CULTURE/COLONY COUNT: CPT

## 2019-08-22 PROCEDURE — 96372 THER/PROPH/DIAG INJ SC/IM: CPT

## 2019-08-22 PROCEDURE — 80320 DRUG SCREEN QUANTALCOHOLS: CPT

## 2019-08-22 PROCEDURE — 81001 URINALYSIS AUTO W/SCOPE: CPT

## 2019-08-22 NOTE — EMERGENCY DEPARTMENT REPORT
ED Psych HPI





- General


Stated Complaint: SUICIDAL


Time Seen by Provider: 08/22/19 22:02


Source: patient, EMS


Mode of arrival: Ambulatory


Limitations: No Limitations





- History of Present Illness


Initial Comments: 





Patient is a 29-year-old male presents to emergency room with multiple 

complaints.  Patient complaining of hallucinations, delusions, paranoia and 

suicidal ideation with the plan.  Patient states that he would cut his neck or 

sheet himself with a gun.  Patient states that his paranoia and delusions are 

getting worse.  Patient states hallucinations are getting worse.  Patient states

he is off his medications.


MD Complaint: suicidal ideation, feels depressed


-: Sudden


Associated Psychiatric Symptoms: depression, suicidal ideation, racing thoughts,

auditory hallucinations, visual hallucinations, delusions


History of same: Yes


Quality: constant


Improves With: medication


Worsens With: other


Context: not taking psychiatric, significant life stressor


Associated Symptoms: denies other symptoms.  denies: confusion, headache, 

shortness of breath, nausea, vomiting, syncope, insomnia


If Self Harm: admits thoughts of, has plan





- Related Data


                                Home Medications











 Medication  Instructions  Recorded  Confirmed  Last Taken


 


FLUoxetine HCL [Prozac] 20 mg PO BID 12/19/18 08/22/19 Unknown


 


Sertraline HCl [Zoloft] 50 mg PO QHS 12/19/18 08/22/19 Unknown








                                  Previous Rx's











 Medication  Instructions  Recorded  Last Taken  Type


 


Benztropine [Cogentin] 1 mg PO QHS #30 tablet 04/28/17 Unknown Rx


 


Divalproex Dr [Depakote Dr] 500 mg PO BID #60 tablet 04/28/17 Unknown Rx


 


Haloperidol [Haldol] 5 mg PO HS #30 tablet 04/28/17 Unknown Rx


 


risperiDONE [RisperDAL] 5 mg PO QHS #30 tablet 04/28/17 Unknown Rx











                                    Allergies











Allergy/AdvReac Type Severity Reaction Status Date / Time


 


quetiapine fumarate Allergy  Unknown Verified 04/28/19 08:46





[From Seroquel]     














ED Review of Systems


ROS: 


Stated complaint: SUICIDAL


Other details as noted in HPI





Constitutional: denies: chills, fever


Eyes: denies: eye pain, eye discharge, vision change


ENT: denies: ear pain, throat pain


Respiratory: denies: cough, shortness of breath, wheezing


Cardiovascular: denies: chest pain, palpitations


Endocrine: no symptoms reported


Gastrointestinal: denies: abdominal pain, nausea, diarrhea


Genitourinary: denies: urgency, dysuria


Musculoskeletal: denies: back pain, joint swelling, arthralgia


Skin: denies: rash, lesions


Neurological: denies: headache, weakness, paresthesias


Psychiatric: depression, auditory hallucinations, visual hallucinations, 

suicidal thoughts.  denies: anxiety


Hematological/Lymphatic: denies: easy bleeding, easy bruising





ED Past Medical Hx





- Past Medical History


Previous Medical History?: Yes


Hx Hypertension: Yes


Hx Seizures: Yes


Hx Psychiatric Treatment: Yes (anxiety, schizophrenia, bipolar)


Hx Asthma: Yes





- Surgical History


Past Surgical History?: No





- Family History


Family history: no significant





- Social History


Smoking Status: Current Every Day Smoker


Substance Use Type: Alcohol, Marijuana





- Medications


Home Medications: 


                                Home Medications











 Medication  Instructions  Recorded  Confirmed  Last Taken  Type


 


Benztropine [Cogentin] 1 mg PO QHS #30 tablet 04/28/17 08/22/19 Unknown Rx


 


Divalproex Dr [Depakote Dr] 500 mg PO BID #60 tablet 04/28/17 08/22/19 Unknown 

Rx


 


Haloperidol [Haldol] 5 mg PO HS #30 tablet 04/28/17 08/22/19 Unknown Rx


 


risperiDONE [RisperDAL] 5 mg PO QHS #30 tablet 04/28/17 08/22/19 Unknown Rx


 


FLUoxetine HCL [Prozac] 20 mg PO BID 12/19/18 08/22/19 Unknown History


 


Sertraline HCl [Zoloft] 50 mg PO QHS 12/19/18 08/22/19 Unknown History














ED Physical Exam





- General


Limitations: No Limitations


General appearance: alert, in no apparent distress





- Head


Head exam: Present: atraumatic, normocephalic





- Eye


Eye exam: Present: normal appearance





- ENT


ENT exam: Present: mucous membranes moist





- Neck


Neck exam: Present: normal inspection





- Respiratory


Respiratory exam: Present: normal lung sounds bilaterally.  Absent: respiratory 

distress





- Cardiovascular


Cardiovascular Exam: Present: regular rate, normal rhythm.  Absent: systolic 

murmur, diastolic murmur, rubs, gallop





- GI/Abdominal


GI/Abdominal exam: Present: soft, normal bowel sounds





- Rectal


Rectal exam: Present: deferred





- Extremities Exam


Extremities exam: Present: normal inspection





- Back Exam


Back exam: Present: normal inspection





- Neurological Exam


Neurological exam: Present: alert, oriented X3





- Psychiatric


Psychiatric exam: Present: normal affect, flat affect, suicidal ideation





- Expanded Psychiatric Exam


  ** Expanded


Focused psych exam: Present: delusional, paranoid, loose associations





- Skin


Skin exam: Present: warm, dry, intact, normal color.  Absent: rash





ED Course


                                   Vital Signs











  08/22/19





  22:43


 


Temperature 99 F


 


Pulse Rate 107 H


 


Respiratory 16





Rate 


 


Blood Pressure 114/70





[Left] 


 


O2 Sat by Pulse 97





Oximetry 














- Reevaluation(s)


Reevaluation #1: 


I discussed all results patient.  Patient is medically clear.  Patient will 

remain on a 1013.


08/23/19 01:11








ED Medical Decision Making





- Lab Data


Result diagrams: 


                                 08/22/19 22:41





                                 08/22/19 22:41





- Medical Decision Making





She is a 29-year-old male that since emergency room for multiple psychiatric 

complaints.  Patient run of acute psychosis along with suicidal ideation with 

plan.  Patient was placed on 1013.  Patient has labs uncinate essentially 

unremarkable except for a UTI.  Patient will be given Bactrim DS.  Patient 

medically cleared.  Patient will remain in to the ER until accepted in to 

appropriate psychiatric facility.





- Differential Diagnosis


Mirza.  Suicidal ideations.  Psychosis


Critical care attestation.: 


If time is entered above; I have spent that time in minutes in the direct care 

of this critically ill patient, excluding procedure time.








ED Disposition


Clinical Impression: 


 Suicidal ideations, Acute psychosis





Disposition: DC/TX-[id number] PSY HOSP/PSY UNIT


Is pt being admited?: No


Does the pt Need Aspirin: No


Condition: Stable


Additional Instructions: 


He is medically cleared


Referrals: 


PRIMARY CARE,MD [Primary Care Provider] - 3-5 Days


Time of Disposition: 01:13

## 2019-08-23 VITALS — SYSTOLIC BLOOD PRESSURE: 98 MMHG | DIASTOLIC BLOOD PRESSURE: 60 MMHG

## 2019-08-23 NOTE — CONSULTATION
History of Present Illness





- Reason for Consult


Consult date: 08/23/19


Reason for consult: Mental Health Evaluation


Requesting physician: EZ VILLA III





- Chief Complaint


Chief complaint: 


"I'm not doing well"








- History of Present Psychiatric Illness


29 y.o. AA male who presented to the ER for bizarre behavior. This patient is 

known to me. The patient was calm, but disorganized during the assessment. He 

appeared preoccupied with loose associations throughout the interview. His 

answers to most questions were not logical. He had to be redirected several 

times to keep him on topic. He would not confirm or deny SI's and AH's. Overall,

the patient was a poor historian. 








Medications and Allergies


                                    Allergies











Allergy/AdvReac Type Severity Reaction Status Date / Time


 


quetiapine fumarate Allergy  Unknown Verified 04/28/19 08:46





[From Seroquel]     











                                Home Medications











 Medication  Instructions  Recorded  Confirmed  Last Taken  Type


 


Benztropine [Cogentin] 1 mg PO QHS #30 tablet 04/28/17 08/22/19 Unknown Rx


 


Divalproex Dr [Depakote Dr] 500 mg PO BID #60 tablet 04/28/17 08/22/19 Unknown 

Rx


 


Haloperidol [Haldol] 5 mg PO HS #30 tablet 04/28/17 08/22/19 Unknown Rx


 


risperiDONE [RisperDAL] 5 mg PO QHS #30 tablet 04/28/17 08/22/19 Unknown Rx


 


FLUoxetine HCL [Prozac] 20 mg PO BID 12/19/18 08/22/19 Unknown History


 


Sertraline HCl [Zoloft] 50 mg PO QHS 12/19/18 08/22/19 Unknown History


 


Sulfamethoxazole/Trimethoprim 1 each PO BID 10 Days #20 tablet 08/23/19  Unknown

 Rx





[Bactrim DS TAB]     














Past psychiatric history





- Past Medical History


Past Medical History: No medical history


Past Surgical History: No surgical history





- past Psychiatric treatment and history


psychiatric treatment history: 


Several inpatient psy settings in the past. Denies a fam psy hx.








- Social History


Social history: Lives alone





Mental Status Exam





- Vital signs


                                Last Vital Signs











Temp  97.6 F   08/23/19 08:00


 


Pulse  71   08/23/19 08:00


 


Resp  18   08/23/19 08:00


 


BP  98/60   08/23/19 08:00


 


Pulse Ox  99   08/23/19 08:00














- Exam


Narrative exam: 


MSE:





 Appearance: calm, cooperative   


 Behavior: regular eye contact   


 Speech: regular rate and tone


 Mood: "okay"


 Affect: flat 


 Thought Process: disorganized                   


 Thought Content: denies HI's and VH's 


 Motor Activity: ambulatory 


 Cognition: A/O x3 


 Insight: poor 


 Judgment: poor 






















































































  

















  

























































































  











  




















Results


Result Diagrams: 


                                 08/22/19 22:41





                                 08/22/19 22:41


                              Abnormal lab results











  08/22/19 08/22/19 08/22/19 Range/Units





  22:26 22:41 22:41 


 


Mono % (Auto)   10.3 H   (0.0-7.3)  %


 


BUN    5 L  (9-20)  mg/dL


 


Glucose    114 H  ()  mg/dL


 


ALT    5 L  (7-56)  units/L


 


Urine WBC (Auto)  9.0 H    (0.0-6.0)  /HPF


 


Salicylates     (2.8-20.0)  mg/dL


 


Acetaminophen     (10.0-30.0)  ug/mL














  08/22/19 08/22/19 Range/Units





  22:41 22:41 


 


Mono % (Auto)    (0.0-7.3)  %


 


BUN    (9-20)  mg/dL


 


Glucose    ()  mg/dL


 


ALT    (7-56)  units/L


 


Urine WBC (Auto)    (0.0-6.0)  /HPF


 


Salicylates  < 0.3 L   (2.8-20.0)  mg/dL


 


Acetaminophen   < 5.0 L  (10.0-30.0)  ug/mL








All other labs normal.








Assessment and Plan


Assessment and plan: 


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 Today the patient was calm, 

but disorganized during the assessment. 





DDx: Schizophrenia, Bipolar DO with psychosis, Substance Induced Psychosis





Recommendation/Plan: Continue 1013. 





Dispo: The patient was accepted at [facility] for inpatient psy 

services.  





Staffed with Dr JIN Eagle.

## 2019-10-13 ENCOUNTER — HOSPITAL ENCOUNTER (EMERGENCY)
Dept: HOSPITAL 5 - ED | Age: 29
LOS: 1 days | Discharge: PSYCHIATRIC OR SUBSTANCE USE FACILITY | End: 2019-10-14
Payer: MEDICAID

## 2019-10-13 DIAGNOSIS — F31.9: Primary | ICD-10-CM

## 2019-10-13 DIAGNOSIS — F15.10: ICD-10-CM

## 2019-10-13 DIAGNOSIS — F12.10: ICD-10-CM

## 2019-10-13 DIAGNOSIS — F14.10: ICD-10-CM

## 2019-10-13 DIAGNOSIS — Z79.899: ICD-10-CM

## 2019-10-13 DIAGNOSIS — I10: ICD-10-CM

## 2019-10-13 DIAGNOSIS — F17.200: ICD-10-CM

## 2019-10-13 DIAGNOSIS — J45.909: ICD-10-CM

## 2019-10-13 DIAGNOSIS — Z88.8: ICD-10-CM

## 2019-10-13 LAB
BASOPHILS # (AUTO): 0.1 K/MM3 (ref 0–0.1)
BASOPHILS NFR BLD AUTO: 1 % (ref 0–1.8)
BENZODIAZEPINES SCREEN,URINE: (no result)
BILIRUB UR QL STRIP: (no result)
BLOOD UR QL VISUAL: (no result)
BUN SERPL-MCNC: 13 MG/DL (ref 9–20)
BUN/CREAT SERPL: 13 %
CALCIUM SERPL-MCNC: 9.6 MG/DL (ref 8.4–10.2)
EOSINOPHIL # BLD AUTO: 0.1 K/MM3 (ref 0–0.4)
EOSINOPHIL NFR BLD AUTO: 1.1 % (ref 0–4.3)
HCT VFR BLD CALC: 44.9 % (ref 35.5–45.6)
HEMOLYSIS INDEX: 3
HGB BLD-MCNC: 15.5 GM/DL (ref 11.8–15.2)
LYMPHOCYTES # BLD AUTO: 1.9 K/MM3 (ref 1.2–5.4)
LYMPHOCYTES NFR BLD AUTO: 35.7 % (ref 13.4–35)
MCHC RBC AUTO-ENTMCNC: 35 % (ref 32–34)
MCV RBC AUTO: 98 FL (ref 84–94)
METHADONE SCREEN,URINE: (no result)
MONOCYTES # (AUTO): 0.5 K/MM3 (ref 0–0.8)
MONOCYTES % (AUTO): 8.8 % (ref 0–7.3)
MUCOUS THREADS #/AREA URNS HPF: (no result) /HPF
OPIATE SCREEN,URINE: (no result)
PH UR STRIP: 5 [PH] (ref 5–7)
PLATELET # BLD: 229 K/MM3 (ref 140–440)
PROT UR STRIP-MCNC: (no result) MG/DL
RBC # BLD AUTO: 4.58 M/MM3 (ref 3.65–5.03)
RBC #/AREA URNS HPF: 2 /HPF (ref 0–6)
UROBILINOGEN UR-MCNC: 2 MG/DL (ref ?–2)
WBC #/AREA URNS HPF: 1 /HPF (ref 0–6)

## 2019-10-13 PROCEDURE — 36415 COLL VENOUS BLD VENIPUNCTURE: CPT

## 2019-10-13 PROCEDURE — G0480 DRUG TEST DEF 1-7 CLASSES: HCPCS

## 2019-10-13 PROCEDURE — 80048 BASIC METABOLIC PNL TOTAL CA: CPT

## 2019-10-13 PROCEDURE — 81001 URINALYSIS AUTO W/SCOPE: CPT

## 2019-10-13 PROCEDURE — 80320 DRUG SCREEN QUANTALCOHOLS: CPT

## 2019-10-13 PROCEDURE — 80307 DRUG TEST PRSMV CHEM ANLYZR: CPT

## 2019-10-13 PROCEDURE — 85025 COMPLETE CBC W/AUTO DIFF WBC: CPT

## 2019-10-13 NOTE — EMERGENCY DEPARTMENT REPORT
ED Psych HPI





- General


Chief Complaint: Psych


Stated Complaint: PYSH EVAL


Time Seen by Provider: 10/13/19 09:46


Source: patient


Mode of arrival: Ambulatory





- History of Present Illness


Initial Comments: 





19-year-old -American male patient with history of bipolar disorder and 

schizophrenia presents with complaints of suicidal thoughts with plan to shoot 

himself with a gun times today.  Patient's speech is tangential, he exhibits 

racing thoughts, and is an unreliable historian.  Nuys any homicidal thoughts or

auditory/visual hallucinations.


MD Complaint: suicidal ideation


-: days(s)


Associated Psychiatric Symptoms: racing thoughts


Associated Symptoms: denies other symptoms


Treatments Prior to Arrival: none





- Related Data


                                Home Medications











 Medication  Instructions  Recorded  Confirmed  Last Taken


 


FLUoxetine HCL [Prozac] 20 mg PO BID 12/19/18 08/22/19 Unknown


 


Sertraline HCl [Zoloft] 50 mg PO QHS 12/19/18 08/22/19 Unknown








                                  Previous Rx's











 Medication  Instructions  Recorded  Last Taken  Type


 


Benztropine [Cogentin] 1 mg PO QHS #30 tablet 04/28/17 Unknown Rx


 


Divalproex  [Depakote Dr] 500 mg PO BID #60 tablet 04/28/17 Unknown Rx


 


Haloperidol [Haldol] 5 mg PO HS #30 tablet 04/28/17 Unknown Rx


 


risperiDONE [RisperDAL] 5 mg PO QHS #30 tablet 04/28/17 Unknown Rx


 


Sulfamethoxazole/Trimethoprim 1 each PO BID 10 Days #20 tablet 08/23/19 Unknown 

Rx





[Bactrim DS TAB]    











                                    Allergies











Allergy/AdvReac Type Severity Reaction Status Date / Time


 


quetiapine fumarate Allergy  Unknown Verified 04/28/19 08:46





[From Seroquel]     














ED Review of Systems


ROS: 


Stated complaint: PYSH EVAL


Other details as noted in HPI








ED Past Medical Hx





- Past Medical History


Previous Medical History?: Yes


Hx Hypertension: Yes


Hx Seizures: Yes


Hx Psychiatric Treatment: Yes (anxiety, schizophrenia, bipolar)


Hx Asthma: Yes





- Surgical History


Past Surgical History?: No





- Social History


Smoking Status: Current Every Day Smoker


Substance Use Type: Alcohol, Cocaine, Heroin, Marijuana, Prescribed, 

Methamphetamines





- Medications


Home Medications: 


                                Home Medications











 Medication  Instructions  Recorded  Confirmed  Last Taken  Type


 


Benztropine [Cogentin] 1 mg PO QHS #30 tablet 04/28/17 08/22/19 Unknown Rx


 


Divalproex Dr [Depakote Dr] 500 mg PO BID #60 tablet 04/28/17 08/22/19 Unknown 

Rx


 


Haloperidol [Haldol] 5 mg PO HS #30 tablet 04/28/17 08/22/19 Unknown Rx


 


risperiDONE [RisperDAL] 5 mg PO QHS #30 tablet 04/28/17 08/22/19 Unknown Rx


 


FLUoxetine HCL [Prozac] 20 mg PO BID 12/19/18 08/22/19 Unknown History


 


Sertraline HCl [Zoloft] 50 mg PO QHS 12/19/18 08/22/19 Unknown History


 


Sulfamethoxazole/Trimethoprim 1 each PO BID 10 Days #20 tablet 08/23/19  Unknown

 Rx





[Bactrim DS TAB]     














ED Physical Exam





- General


Limitations: No Limitations





ED Course


                                   Vital Signs











  10/13/19 10/13/19 10/13/19





  08:46 09:38 14:00


 


Temperature 98.3 F 98.0 F 97.9 F


 


Pulse Rate 124 H 100 H 101 H


 


Respiratory 20 18 18





Rate   


 


Blood Pressure 136/77  


 


Blood Pressure  117/83 97/64





[Left]   


 


O2 Sat by Pulse 96 98 100





Oximetry   














ED Medical Decision Making





- Lab Data


Result diagrams: 


                                 10/13/19 09:17





                                 10/13/19 09:17





- Medical Decision Making





Patient placed on 1013 for suicidal thoughts and apparent manic episode.  He is 

medically cleared.  She continues to pend mental health assessment


Critical care attestation.: 


If time is entered above; I have spent that time in minutes in the direct care 

of this critically ill patient, excluding procedure time.








ED Disposition


Clinical Impression: 


 Suicidal thoughts, Manic behavior





Disposition: DC/TX-65 PSY HOSP/PSY UNIT


Is pt being admited?: No


Condition: Stable


Referrals: 


PRIMARY CARE,MD [Primary Care Provider] - 3-5 Days

## 2019-10-14 VITALS — DIASTOLIC BLOOD PRESSURE: 56 MMHG | SYSTOLIC BLOOD PRESSURE: 112 MMHG

## 2019-10-21 ENCOUNTER — HOSPITAL ENCOUNTER (EMERGENCY)
Dept: HOSPITAL 5 - ED | Age: 29
Discharge: PSYCHIATRIC OR SUBSTANCE USE FACILITY | End: 2019-10-21
Payer: MEDICAID

## 2019-10-21 VITALS — SYSTOLIC BLOOD PRESSURE: 104 MMHG | DIASTOLIC BLOOD PRESSURE: 63 MMHG

## 2019-10-21 DIAGNOSIS — R41.82: ICD-10-CM

## 2019-10-21 DIAGNOSIS — F17.200: ICD-10-CM

## 2019-10-21 DIAGNOSIS — I10: ICD-10-CM

## 2019-10-21 DIAGNOSIS — R45.851: Primary | ICD-10-CM

## 2019-10-21 DIAGNOSIS — Z88.8: ICD-10-CM

## 2019-10-21 DIAGNOSIS — Z79.899: ICD-10-CM

## 2019-10-21 DIAGNOSIS — F12.10: ICD-10-CM

## 2019-10-21 DIAGNOSIS — F25.0: ICD-10-CM

## 2019-10-21 DIAGNOSIS — J45.909: ICD-10-CM

## 2019-10-21 DIAGNOSIS — F41.9: ICD-10-CM

## 2019-10-21 LAB
ALT SERPL-CCNC: 6 UNITS/L (ref 7–56)
BASOPHILS # (AUTO): 0 K/MM3 (ref 0–0.1)
BASOPHILS NFR BLD AUTO: 0.5 % (ref 0–1.8)
BENZODIAZEPINES SCREEN,URINE: (no result)
BILIRUB UR QL STRIP: (no result)
BLOOD UR QL VISUAL: (no result)
BUN SERPL-MCNC: 9 MG/DL (ref 9–20)
BUN/CREAT SERPL: 10 %
CALCIUM SERPL-MCNC: 9 MG/DL (ref 8.4–10.2)
EOSINOPHIL # BLD AUTO: 0.2 K/MM3 (ref 0–0.4)
EOSINOPHIL NFR BLD AUTO: 2.3 % (ref 0–4.3)
HCT VFR BLD CALC: 44.2 % (ref 35.5–45.6)
HEMOLYSIS INDEX: 16
HGB BLD-MCNC: 15.1 GM/DL (ref 11.8–15.2)
LYMPHOCYTES # BLD AUTO: 2.7 K/MM3 (ref 1.2–5.4)
LYMPHOCYTES NFR BLD AUTO: 37 % (ref 13.4–35)
MCHC RBC AUTO-ENTMCNC: 34 % (ref 32–34)
MCV RBC AUTO: 97 FL (ref 84–94)
METHADONE SCREEN,URINE: (no result)
MONOCYTES # (AUTO): 0.5 K/MM3 (ref 0–0.8)
MONOCYTES % (AUTO): 7.4 % (ref 0–7.3)
MUCOUS THREADS #/AREA URNS HPF: (no result) /HPF
OPIATE SCREEN,URINE: (no result)
PH UR STRIP: 6 [PH] (ref 5–7)
PLATELET # BLD: 201 K/MM3 (ref 140–440)
PROT UR STRIP-MCNC: (no result) MG/DL
RBC # BLD AUTO: 4.55 M/MM3 (ref 3.65–5.03)
RBC #/AREA URNS HPF: 1 /HPF (ref 0–6)
UROBILINOGEN UR-MCNC: < 2 MG/DL (ref ?–2)
WBC #/AREA URNS HPF: 2 /HPF (ref 0–6)

## 2019-10-21 PROCEDURE — 80048 BASIC METABOLIC PNL TOTAL CA: CPT

## 2019-10-21 PROCEDURE — 81001 URINALYSIS AUTO W/SCOPE: CPT

## 2019-10-21 PROCEDURE — 83690 ASSAY OF LIPASE: CPT

## 2019-10-21 PROCEDURE — 84075 ASSAY ALKALINE PHOSPHATASE: CPT

## 2019-10-21 PROCEDURE — 80164 ASSAY DIPROPYLACETIC ACD TOT: CPT

## 2019-10-21 PROCEDURE — 84460 ALANINE AMINO (ALT) (SGPT): CPT

## 2019-10-21 PROCEDURE — 80307 DRUG TEST PRSMV CHEM ANLYZR: CPT

## 2019-10-21 PROCEDURE — 85025 COMPLETE CBC W/AUTO DIFF WBC: CPT

## 2019-10-21 PROCEDURE — G0480 DRUG TEST DEF 1-7 CLASSES: HCPCS

## 2019-10-21 PROCEDURE — 82150 ASSAY OF AMYLASE: CPT

## 2019-10-21 PROCEDURE — 84450 TRANSFERASE (AST) (SGOT): CPT

## 2019-10-21 PROCEDURE — 36415 COLL VENOUS BLD VENIPUNCTURE: CPT

## 2019-10-21 PROCEDURE — 80320 DRUG SCREEN QUANTALCOHOLS: CPT

## 2019-10-21 NOTE — EMERGENCY DEPARTMENT REPORT
ED Psych HPI





- General


Chief Complaint: Psych


Stated Complaint: SCHIZO/BIPOLAR MANIC DEPRESSIVE EPISODE


Time Seen by Provider: 10/21/19 04:34


Source: patient


Mode of arrival: Ambulatory





- History of Present Illness


Initial Comments: 





Patient is 29 years old male with history of schizophrenia.  Patient presented 

to the ER stating that he is hearing voices asking him to kill himself.  Patient

denied any visual hallucination but admitted auditory hallucination.  Patient 

denied any homicidal ideation.


MD Complaint: suicidal ideation, altered mental status


Associated Psychiatric Symptoms: suicidal ideation, racing thoughts, auditory 

hallucinations


History of same: Yes


Quality: constant


Associated Symptoms: denies other symptoms


Treatments Prior to Arrival: none


If Self Harm: admits thoughts of





- Related Data


                                Home Medications











 Medication  Instructions  Recorded  Confirmed  Last Taken


 


Sertraline HCl [Zoloft] 50 mg PO QHS 12/19/18 10/21/19 10/20/19


 


OLANZapine [Zyprexa] 20 mg PO QHS 10/21/19 10/21/19 10/20/19


 


Paliperidone Palmitate(Nf) [Invega 234 mg IM QMONTH 10/21/19 10/21/19 10/08/19





Sustenna(Nf)]    








                                  Previous Rx's











 Medication  Instructions  Recorded  Last Taken  Type


 


Benztropine [Cogentin] 1 mg PO QHS #30 tablet 04/28/17 10/20/19 Rx


 


Divalproex Dr [Depakote Dr] 500 mg PO BID #60 tablet 04/28/17 10/20/19 Rx


 


Haloperidol [Haldol] 5 mg PO HS #30 tablet 04/28/17 10/20/19 Rx


 


risperiDONE [RisperDAL] 5 mg PO QHS #30 tablet 04/28/17 10/20/19 Rx











                                    Allergies











Allergy/AdvReac Type Severity Reaction Status Date / Time


 


quetiapine fumarate Allergy  Unknown Verified 04/28/19 08:46





[From Seroquel]     














ED Review of Systems


ROS: 


Stated complaint: SCHIZO/BIPOLAR MANIC DEPRESSIVE EPISODE


Other details as noted in HPI





Comment: All other systems reviewed and negative


Constitutional: denies: chills, fever


Respiratory: denies: cough, shortness of breath, SOB with exertion


Cardiovascular: denies: chest pain, palpitations


Gastrointestinal: denies: abdominal pain, nausea, vomiting


Musculoskeletal: denies: back pain


Neurological: denies: headache, weakness, numbness, paresthesias, confusion, 

abnormal gait


Psychiatric: auditory hallucinations, suicidal thoughts.  denies: visual 

hallucinations, homicidal thoughts





ED Past Medical Hx





- Past Medical History


Previous Medical History?: Yes


Hx Hypertension: Yes


Hx Seizures: Yes


Hx Psychiatric Treatment: Yes (anxiety, schizophrenia, bipolar)


Hx Asthma: Yes





- Surgical History


Past Surgical History?: No





- Social History


Smoking Status: Current Every Day Smoker


Substance Use Type: Alcohol, Cocaine, Marijuana





- Medications


Home Medications: 


                                Home Medications











 Medication  Instructions  Recorded  Confirmed  Last Taken  Type


 


Benztropine [Cogentin] 1 mg PO QHS #30 tablet 04/28/17 10/21/19 10/20/19 Rx


 


Divalproex Dr [Depakote Dr] 500 mg PO BID #60 tablet 04/28/17 10/21/19 10/20/19 

Rx


 


Haloperidol [Haldol] 5 mg PO HS #30 tablet 04/28/17 10/21/19 10/20/19 Rx


 


risperiDONE [RisperDAL] 5 mg PO QHS #30 tablet 04/28/17 10/21/19 10/20/19 Rx


 


Sertraline HCl [Zoloft] 50 mg PO QHS 12/19/18 10/21/19 10/20/19 History


 


OLANZapine [Zyprexa] 20 mg PO QHS 10/21/19 10/21/19 10/20/19 History


 


Paliperidone Palmitate(Nf) [Invega 234 mg IM QMONTH 10/21/19 10/21/19 10/08/19 

History





Sustenna(Nf)]     














ED Physical Exam





- General


Limitations: No Limitations


General appearance: alert, in no apparent distress, anxious





- Head


Head exam: Present: atraumatic, normocephalic, normal inspection





- Eye


Eye exam: Present: normal appearance





- ENT


ENT exam: Present: normal exam, normal orophraynx, mucous membranes moist





- Neck


Neck exam: Present: normal inspection, full ROM.  Absent: tenderness, 

meningismus, lymphadenopathy, thyromegaly





- Respiratory


Respiratory exam: Present: normal lung sounds bilaterally





- Cardiovascular


Cardiovascular Exam: Present: regular rate, normal rhythm, normal heart sounds





- GI/Abdominal


GI/Abdominal exam: Present: soft, normal bowel sounds.  Absent: distended, 

tenderness, guarding, rebound, rigid, mass, bruit, pulsatile mass, hernia





- Extremities Exam


Extremities exam: Present: normal inspection, full ROM, normal capillary refill.

 Absent: pedal edema, calf tenderness





- Back Exam


Back exam: Present: normal inspection, full ROM.  Absent: CVA tenderness (R), 

CVA tenderness (L), muscle spasm, paraspinal tenderness, vertebral tenderness





- Neurological Exam


Neurological exam: Present: alert, oriented X3, CN II-XII intact, normal gait, 

reflexes normal





- Psychiatric


Psychiatric exam: Present: anxious, suicidal ideation.  Absent: depressed, flat 

affect, manic, homicidal ideation





- Skin


Skin exam: Present: warm, intact, normal color





ED Course


                                   Vital Signs











  10/21/19 10/21/19 10/21/19





  03:28 08:40 17:33


 


Temperature 98.1 F 97.8 F 98.1 F


 


Pulse Rate 98 H 85 88


 


Respiratory 18 18 





Rate   


 


Blood Pressure 119/73  


 


Blood Pressure  106/63 102/58





[106/63]   


 


O2 Sat by Pulse 100 99 100





Oximetry   














  10/21/19





  20:57


 


Temperature 97.5 F L


 


Pulse Rate 70


 


Respiratory 18





Rate 


 


Blood Pressure 


 


Blood Pressure 104/63





[106/63] 


 


O2 Sat by Pulse 100





Oximetry 














ED Medical Decision Making





- Lab Data


Result diagrams: 


                                 10/21/19 03:46





                                 10/21/19 03:46


Critical care attestation.: 


If time is entered above; I have spent that time in minutes in the direct care 

of this critically ill patient, excluding procedure time.








ED Disposition


Clinical Impression: 


 Suicidal ideations





Disposition: DC/TX-65 PSY HOSP/PSY UNIT


Is pt being admited?: No


Condition: Stable


Referrals: 


PRIMARY CARE,MD [Primary Care Provider] - 3-5 Days

## 2019-10-21 NOTE — CONSULTATION
History of Present Illness





- Reason for Consult


Consult date: 10/21/19


Reason for consult: Mental Health Evaluation


Requesting physician: EDITH GRAHAM





- Chief Complaint


Chief complaint: 


"I'm hearing the voices"








- History of Present Psychiatric Illness


29 y.o. AA male who presented to the ER for AH's and SI's. This patient is known

to me. Today the patient was calm during the assessment. He stated that the 

voices he is hearing are "not good." He stated that the voices are telling him 

to kill himself. The patient's presentation is similar to a previous ER visit. 

The patient stated that he take Depakote and another medication for his mental 

health. He stated that he was recently discharged from a mental health facility.

He denies any previous suicide attempts when asked. He denies HI's and VH's. He 

denies erratic sleep and a poor appetite. He denies recreational drug use and 

alcohol consumption (etoh). 








Medications and Allergies


                                    Allergies











Allergy/AdvReac Type Severity Reaction Status Date / Time


 


quetiapine fumarate Allergy  Unknown Verified 04/28/19 08:46





[From Seroquel]     











                                Home Medications











 Medication  Instructions  Recorded  Confirmed  Last Taken  Type


 


Benztropine [Cogentin] 1 mg PO QHS #30 tablet 04/28/17 10/13/19 1 Day Ago Rx





    ~10/12/19 


 


Divalproex Dr [Depakote Dr] 500 mg PO BID #60 tablet 04/28/17 10/13/19 1 Day Ago

Rx





    ~10/12/19 


 


Haloperidol [Haldol] 5 mg PO HS #30 tablet 04/28/17 10/13/19 1 Day Ago Rx





    ~10/12/19 


 


risperiDONE [RisperDAL] 5 mg PO QHS #30 tablet 04/28/17 10/13/19 1 Day Ago Rx





    ~10/12/19 


 


FLUoxetine HCL [Prozac] 20 mg PO BID 12/19/18 10/13/19 1 Day Ago History





    ~10/12/19 


 


Sertraline HCl [Zoloft] 50 mg PO QHS 12/19/18 10/13/19 1 Day Ago History





    ~10/12/19 


 


Sulfamethoxazole/Trimethoprim 1 each PO BID 10 Days #20 tablet 08/23/19 10/13/19

 1 Month Ago Rx





[Bactrim DS TAB]    ~09/13/19 














Past psychiatric history





- Past Medical History


Past Medical History: No medical history


Past Surgical History: No surgical history





- past Psychiatric treatment and history


psychiatric treatment history: 


Several inpatient psy settings in the past. Denies a fam psy hx. 








- Social History


Social history: other (Reside at a group home)





Mental Status Exam





- Vital signs


                                Last Vital Signs











Temp  98.1 F   10/21/19 03:28


 


Pulse  98 H  10/21/19 03:28


 


Resp  18   10/21/19 03:28


 


BP  119/73   10/21/19 03:28


 


Pulse Ox  100   10/21/19 03:28














- Exam


Narrative exam: 


MSE:





 Appearance: calm


 Behavior: poor eye contact   


 Speech: regular rate and low tone


 Mood: preoccupied


 Affect: flat 


 Thought Process: circumstantial                    


 Thought Content: denies HI's and VH's, responding to some type of stimuli 


 Motor Activity: ambulatory 


 Cognition: A/O x3 


 Insight: variable 


 Judgment: poor 






















































































  

















  

























































































  











  




















Results


Result Diagrams: 


                                 10/21/19 03:46





                                 10/21/19 03:46


                              Abnormal lab results











  10/21/19 10/21/19 10/21/19 Range/Units





  03:46 03:46 03:46 


 


MCV    97 H  (84-94)  fl


 


MCH    33 H  (28-32)  pg


 


Lymph % (Auto)    37.0 H  (13.4-35.0)  %


 


Mono % (Auto)    7.4 H  (0.0-7.3)  %


 


Salicylates  < 0.3 L    (2.8-20.0)  mg/dL


 


Acetaminophen   < 5.0 L   (10.0-30.0)  ug/mL








All other labs normal.








Assessment and Plan


Assessment and plan: 


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 Today the patient was calm 

during the assessment. 





DDx: Schizophrenia, Bipolar DO with psychosis, SCAD





Recommendation/Plan: Continue 1013. and start Depakote 500 mg PO BID for mood 

and Zyprexa 5 mg PO HS for mood/psychosis. Discussed possible metabolic side 

effects of Zyprexa with the patient, he verbalized understanding. Baseline 

A1c/Lipid Panel ordered for the AM. 





Dispo: The patient was referred to inpatient psy services.  





Will staff with Dr JIN Eagle.

## 2020-02-09 NOTE — EMERGENCY DEPARTMENT REPORT
HPI





<YINGLYNETTE - Last Filed: 02/10/20 15:55>





- HPI


HPI: 





29-year-old -American male presents the emergency department for a mental

health evaluation.  He has a history of bipolar disorder and schizophrenia and 

says he has not been compliant with his medications.  He says that he does have 

a psychiatrist through [facility] but has not seen them recently.  The patient 

admits to both auditory and visual hallucinations, as well as suicidal 

ideations.  The patient goes on some type of rambling speech about having 

psychosis in which she says that there is a "disconnect in my brain" and some 

type of a "chemical reaction."  Patient says that he hears voices telling him 

that they are going to kill him.  He has visual hallucinations that includes 

seeing "cartoons" and "seeing people being beat up" that are not there.  Patient

says that he is suicidal and would kill himself by shooting himself with a gun 

and says that he has access to a gun.





<TUSHAR MORTON S - Last Filed: 20 09:14>





- General


Chief Complaint: Psych


Time Seen by Provider: 20 22:40





ED Past Medical Hx





<HELYNETTE - Last Filed: 02/10/20 15:55>





- Past Medical History


Hx Hypertension: Yes


Hx Seizures: Yes


Hx Psychiatric Treatment: Yes (anxiety, schizophrenia, bipolar)


Hx Asthma: Yes





- Surgical History


Past Surgical History?: No





- Social History


Smoking Status: Current Every Day Smoker


Substance Use Type: Alcohol, Marijuana





<TUSHAR MORTON S - Last Filed: 20 09:14>





- Medications


Home Medications: 


                                Home Medications











 Medication  Instructions  Recorded  Confirmed  Last Taken  Type


 


Benztropine [Cogentin] 1 mg PO QHS #30 tablet 04/28/17 10/21/19 10/20/19 Rx


 


Divalproex Dr [Depakote Dr] 500 mg PO BID #60 tablet 04/28/17 10/21/19 10/20/19 

Rx


 


haloperidoL [Haldol] 5 mg PO HS #30 tablet 04/28/17 10/21/19 10/20/19 Rx


 


risperiDONE [RisperDAL] 5 mg PO QHS #30 tablet 04/28/17 10/21/19 10/20/19 Rx


 


Sertraline HCl [Zoloft] 50 mg PO QHS 12/19/18 10/21/19 10/20/19 History


 


OLANZapine [Zyprexa] 20 mg PO QHS 10/21/19 10/21/19 10/20/19 History


 


Paliperidone Palmitate(Nf) [Invega 234 mg IM QMONTH 10/21/19 10/21/19 10/08/19 

History





Sustenna(Nf)]     


 


Nitrofurantoin Mono/M-Cryst 100 mg PO Q12HR #14 capsule 20  Unknown Rx





[Macrobid CAP]     














ED Review of Systems


ROS: 


Stated complaint: PSYCH EVAL/ SI


Other details as noted in HPI








<LYNETTE HE - Last Filed: 02/10/20 15:55>


ROS: 


Stated complaint: PSYCH EVAL/ SI


Other details as noted in HPI





Comment: All other systems reviewed and negative


Constitutional: denies: chills, fever


Respiratory: denies: shortness of breath


Cardiovascular: denies: chest pain


Gastrointestinal: denies: abdominal pain, vomiting


Musculoskeletal: denies: back pain


Neurological: denies: headache, weakness


Psychiatric: auditory hallucinations, visual hallucinations, suicidal thoughts. 

denies: homicidal thoughts





<TUSHAR MORTON - Last Filed: 20 09:14>





Physical Exam





- Physical Exam


Vital Signs: 


                                   Vital Signs











  20





  22:25 22:29 23:08


 


Temperature 97.9 F 97.9 F 


 


Pulse Rate 109 H 109 H 


 


Respiratory 16 16 15





Rate   


 


Blood Pressure 131/93  


 


Blood Pressure  131/93 





[Right]   


 


O2 Sat by Pulse 98 98 





Oximetry   














  02/10/20 02/10/20 02/10/20





  02:25 07:00 13:00


 


Temperature 98.0 F 98.1 F 98.2 F


 


Pulse Rate 83 79 95 H


 


Respiratory 16 20 18





Rate   


 


Blood Pressure   


 


Blood Pressure 109/74 103/70 101/63





[Right]   


 


O2 Sat by Pulse 100 99 98





Oximetry   














<LYNETTE HE - Last Filed: 02/10/20 15:55>





- Physical Exam


Vital Signs: 


                                   Vital Signs











  20





  22:25


 


Temperature 97.9 F


 


Pulse Rate 109 H


 


Respiratory 16





Rate 


 


Blood Pressure 131/93


 


O2 Sat by Pulse 98





Oximetry 











Physical Exam: 





GENERAL: The patient is well-developed well-nourished.


HEENT: Normocephalic.  Atraumatic.  Patient has moist mucous membranes.


EYES: Extraocular motions are intact. 


NECK: Supple.  Trachea is midline. 


CHEST/LUNGS: Clear to auscultation.  There is no respiratory distress noted.


HEART/CARDIOVASCULAR: Regular.  There is no tachycardia.  There is no murmur.


ABDOMEN: Abdomen is soft, nontender.  Patient has normal bowel sounds.  


SKIN:Skin is warm and dry. .


NEURO: The patient is awake, alert, and oriented.  The patient is cooperative.  

The patient has no focal neurologic deficits.  Normal speech. 


MUSCULOSKELETAL: There is no tenderness or deformity. There is no evidence of 

acute injury.





<TUSHAR MORTON S - Last Filed: 20 09:14>





ED Course


                                   Vital Signs











  20





  22:25 22:29 23:08


 


Temperature 97.9 F 97.9 F 


 


Pulse Rate 109 H 109 H 


 


Respiratory 16 16 15





Rate   


 


Blood Pressure 131/93  


 


Blood Pressure  131/93 





[Right]   


 


O2 Sat by Pulse 98 98 





Oximetry   














  02/10/20 02/10/20 02/10/20





  02:25 07:00 13:00


 


Temperature 98.0 F 98.1 F 98.2 F


 


Pulse Rate 83 79 95 H


 


Respiratory 16 20 18





Rate   


 


Blood Pressure   


 


Blood Pressure 109/74 103/70 101/63





[Right]   


 


O2 Sat by Pulse 100 99 98





Oximetry   














- Reevaluation(s)


Reevaluation #1: 





02/10/20 15:55





SHANNAN CALDERA   Male : 1990  MedRec# [id number]





02/10/20 13:56 - MH 's Note by NICK JONES


   Acct Num: [id number]  : 1990  Patient Age: 29





Pt is a 30 yo AA male presenting to ED for MHE, as pt initially reported A/V H 

and SI with plan to shoot self.  Pt reported access to gun.   During ax, pt 

presented as cooperative, with calm mood and flat affect. Pt presented with poor

 eyesight and some delayed responses throughout ax, appearing as if he was 

attempting to recall what he previously told .  Pt denies SI/HI and 

denies hx of attempts.  Pt reports A/H that tell him to go places, such as the 

store, the hospital and outside.  Pt denies commands to hurt self or others.  Pt

 informed  that he has a dx of Schizoaffective Disorder and reported 

non-compliance with medication for the past 3 months.  Pt reports being 

connected to [facility] Team. 


Pt reports marijuana use, reporting one blunt weekly, with last use being 

yesterday.  Pt identifies onset age of 12. Pt denies legal issues.  Pt denies 

sleep issues.  Pt denies appetite issues.  


 contacted  Ramona at [facility] for collateral information. Per 

Ramona, pt is on Invega IM, and has been compliant with medication/OP tx.  Ramona 

informed  that pt tends to go to the hospital when he doesnt get what 

he wants.  Ramona identified hx of frequent admissions to ED for secondary gains 

of placement.  Ramona confirmed that pt stays at a group home and does not have 

access to a gun.  Ramona informed  that pt has a poor memory and tends to 

forget information that he previously reported. Ramona stated that pt can return 

to home and will have visit from ACT Team within 24 hours of discharge.  Ramona is

 requesting transport via [facility] to avoid having pt transport to an 

alternative ED. 





Recommendations:  briefed case with attending psychiatrist, who is 

recommending rescinding 1013 and discharge with f/u OP TX.  





Nick Jones LMSW





Initialized on 02/10/20 13:56 - END OF NOTE








<LYNETTE HE - Last Filed: 02/10/20 15:55>


                                   Vital Signs











  20





  22:25


 


Temperature 97.9 F


 


Pulse Rate 109 H


 


Respiratory 16





Rate 


 


Blood Pressure 131/93


 


O2 Sat by Pulse 98





Oximetry 














<TUSHAR MORTON - Last Filed: 20 09:14>





ED Medical Decision Making





- Lab Data


Result diagrams: 


                                 20 23:04





                                 20 23:04





<LYNETTE HE - Last Filed: 02/10/20 15:55>





- Lab Data


Result diagrams: 


                                 20 23:04





                                 20 23:04





- Medical Decision Making





This patient presents for a mental health evaluation.  He has a history of 

bipolar disorder and schizophrenia and admits to both auditory and visual 

hallucinations.  On top of this acute psychosis, the patient claims to have 

suicidal ideations with a plan to shoot himself with a gun, which he says he has

 access to.  For this reason he has been made a 1013.  His labs are mostly 

unremarkable except for positive marijuana UDS and a urinary tract infection.  

The UTI will be treated with Macrobid.  Vital signs stable throughout his ED 

course thus far.  At this point the patient appears medically cleared for 

psychiatric placement and will be seen by the psychiatric assessment team in the

 morning.





- Differential Diagnosis


bipolar disorder, schizophrenia, schizoaffective, substance abuse





<TUSHAR MORTON - Last Filed: 20 09:14>


Critical care attestation.: 


If time is entered above; I have spent that time in minutes in the direct care 

of this critically ill patient, excluding procedure time.








<LYNETTE HE - Last Filed: 02/10/20 15:55>


Critical Care Time: No


Critical care attestation.: 


If time is entered above; I have spent that time in minutes in the direct care 

of this critically ill patient, excluding procedure time.








<TUSHAR MORTON - Last Filed: 20 09:14>





ED Disposition


Is pt being admited?: No


Does the pt Need Aspirin: No





<LYNETTE HE - Last Filed: 02/10/20 15:55>


Is pt being admited?: No


Time of Disposition: 00:18





<TUSHAR MORTON S - Last Filed: 20 09:14>


Clinical Impression: 


 Acute psychosis, Malingering





UTI (urinary tract infection)


Qualifiers:


 Urinary tract infection type: acute cystitis Hematuria presence: without 

hematuria Qualified Code(s): N30.00 - Acute cystitis without hematuria





Disposition:  TO HOME OR SELFCARE


Condition: Stable


Prescriptions: 


Nitrofurantoin Mono/M-Cryst [Macrobid CAP] 100 mg PO Q12HR #14 capsule


Referrals: 


PRIMARY CARE,MD [Primary Care Provider] - 3-5 Days

## 2020-09-29 NOTE — EMERGENCY DEPARTMENT REPORT
ED General Adult HPI





- General


Chief complaint: Psych


Stated complaint: MH EVAL/SUICIDAL


PUI?: No


Time Seen by Provider: 20 23:47


Source: patient, EMS ( EMS documentation not available at time of chart 

dictation ), RN notes reviewed, old records reviewed


Mode of arrival: Ambulatory


Limitations: No Limitations





- History of Present Illness


Initial comments: 





The patient was evaluated in the emergency department for symptoms described in 

the history of present illness.  He/she was evaluated in the context of the 

global COVID-19 pandemic, which necessitated consideration that the patient 

might be at risk for infection with the virus that causes COVID-19.  

Institutional protocols and algorithms that pertain to the evaluation of 

patients at risk for COVID-19 are in a state of rapid change based on 

information released by regulatory bodies including the CDC and federal and 

state organizations.  These policies and algorithms were followed during the 

patient's care in the emergency department.  Please note that these policies, 

procedures and recommendations changed on a rapid basis.





Mr. Lopez is a 30-year-old gentleman whom I evaluated in the past, with a 

history of psychiatric disease.  He contacted emergency medical services, 

"because I want to go to a psych hospital."  He reports that he feels homicidal 

or suicidal.  He does not have a discrete plan to kill himself.  He does not 

have discrete plan to harm other people.





He denies hallucinations.  He denies access to guns and firearms.  He denies 

wanting to overdose.





He denies headache, neck pain, chest pain, abdominal pain, shortness of breath 

and urinary symptoms.  He reports that he has a chronically thickened skin on 

his left gluteal cheek.  He denies trauma, and dyschezia.





He denies COVID symptomatology.





He states he is compliant with his medications.





He is not accompanied by friends or family at this time for additional 

information or collateral information.  He does not describe aggravating, 

relieving factors, exacerbating factors, or radiation factors.








-: Gradual


Radiation: other


Quality: other


Consistency: other


Improves with: other


Worsens with: other





- Related Data


                                Home Medications











 Medication  Instructions  Recorded  Confirmed  Last Taken


 


Paliperidone Palmitate [Invega 234 mg IM QMONTH 10/21/19 09/29/20 10/08/19





Sustenna(Nf)]    








                                  Previous Rx's











 Medication  Instructions  Recorded  Last Taken  Type


 


Benztropine [Cogentin] 1 mg PO QHS #30 tablet 04/28/17 10/20/19 Rx


 


Divalproex Dr [Depakote Dr] 500 mg PO BID #60 tablet 04/28/17 10/20/19 Rx


 


haloperidoL [Haldol] 5 mg PO HS #30 tablet 04/28/17 10/20/19 Rx











                                    Allergies











Allergy/AdvReac Type Severity Reaction Status Date / Time


 


quetiapine fumarate Allergy  Unknown Verified 20 08:14





[From Seroquel]     














ED Review of Systems


ROS: 


Stated complaint: MH EVAL/SUICIDAL


Other details as noted in HPI





Constitutional: denies: fever, malaise


Eyes: denies: eye discharge, vision change


Respiratory: denies: see HPI


Cardiovascular: denies: chest pain


Gastrointestinal: denies: abdominal pain


Genitourinary: denies: dysuria


Musculoskeletal: denies: myalgia


Skin: as per HPI


Neurological: denies: weakness


Psychiatric: as per HPI, homicidal thoughts, suicidal thoughts





ED Past Medical Hx





- Past Medical History


Previous Medical History?: Yes


Hx Hypertension: Yes


Hx Seizures: Yes


Hx Psychiatric Treatment: Yes (anxiety, schizophrenia, bipolar)


Hx Asthma: Yes





- Surgical History


Past Surgical History?: No





- Social History


Smoking Status: Current Every Day Smoker


Substance Use Type: Alcohol





- Medications


Home Medications: 


                                Home Medications











 Medication  Instructions  Recorded  Confirmed  Last Taken  Type


 


Benztropine [Cogentin] 1 mg PO QHS #30 tablet 04/28/17 09/29/20 10/20/19 Rx


 


Divalproex Dr [Depakote Dr] 500 mg PO BID #60 tablet 04/28/17 09/29/20 10/20/19 

Rx


 


haloperidoL [Haldol] 5 mg PO HS #30 tablet 04/28/17 09/29/20 10/20/19 Rx


 


Paliperidone Palmitate [Invega 234 mg IM QMONTH 10/21/19 09/29/20 10/08/19 His

tory





Sustenna(Nf)]     














ED Physical Exam





- General


Limitations: No Limitations


General appearance: alert, in no apparent distress





- Head


Head exam: Present: atraumatic, normocephalic





- Eye


Eye exam: Present: normal appearance, EOMI.  Absent: nystagmus





- ENT


ENT exam: Present: normal exam, normal orophraynx, mucous membranes moist, 

normal external ear exam





- Neck


Neck exam: Present: normal inspection, full ROM.  Absent: tenderness, 

meningismus





- Respiratory


Respiratory exam: Present: normal lung sounds bilaterally.  Absent: respiratory 

distress, wheezes, rales, rhonchi, stridor, decreased breath sounds





- Cardiovascular


Cardiovascular Exam: Present: regular rate, normal rhythm, normal heart sounds. 

Absent: bradycardia, tachycardia, irregular rhythm, systolic murmur, diastolic 

murmur, rubs, gallop





- GI/Abdominal


GI/Abdominal exam: Present: soft.  Absent: distended, tenderness, guarding, 

rebound, rigid, pulsatile mass





- Rectal


Rectal exam: Present: normal inspection (Chaperoned by Shreya Martin)





- Extremities Exam


Extremities exam: Present: normal inspection, full ROM, other (2+ pulses noted 

in the bilateral upper and lower extremities.  There is no palpable cord.   

negative Homans sign.  Muscular compartments are soft.  The pelvis is stable.). 

Absent: pedal edema, calf tenderness





- Back Exam


Back exam: Present: normal inspection, full ROM.  Absent: tenderness, CVA 

tenderness (R), CVA tenderness (L), paraspinal tenderness, vertebral tenderness





- Neurological Exam


Neurological exam: Present: alert, oriented X3, normal gait, other (No facial 

droop.  Tongue midline.  Extraocular movements intact bilaterally.  Facial 

sensation intact to light touch in V1, V2, V3 distribution bilaterally.  5 and a

5 strength in 4 extremities.  Sensation intact to light touch in 4 

extremities.).  Absent: motor sensory deficit





- Psychiatric


Psychiatric exam: Present: flat affect.  Absent: agitated, anxious





- Skin


Skin exam: Present: warm, dry, intact, normal color.  Absent: rash





ED Course


                                   Vital Signs











  20





  23:53 00:42 08:53


 


Temperature  97.3 F L 97.5 F L


 


Pulse Rate  80 80


 


Respiratory 18 16 14





Rate   


 


Blood Pressure   102/59


 


Blood Pressure  110/70 





[Right]   


 


O2 Sat by Pulse  99 97





Oximetry   














  20





  18:01 03:12 07:45


 


Temperature 97.6 F 98.0 F 


 


Pulse Rate 81 77 


 


Respiratory 18 18 16





Rate   


 


Blood Pressure   


 


Blood Pressure 109/64 95/56 





[Right]   


 


O2 Sat by Pulse 95 96 





Oximetry   














  20





  07:46


 


Temperature 97.8 F


 


Pulse Rate 69


 


Respiratory 20





Rate 


 


Blood Pressure 


 


Blood Pressure 98/63





[Right] 


 


O2 Sat by Pulse 96





Oximetry 














- Reevaluation(s)


Reevaluation #1: 





20 00:39


Differential diagnosis, including but not limited to: Psychosis, medical 

clearance for psychiatric placement





Assessment and plan: 30-year-old gentleman, here with psychiatric complaint.





Physical exam benign and unremarkable.





Patient presents with a flat affect and endorses that he is both homicidal and s

uicidal.





Patient placed on hold status, psychiatric consultation is requested, 

appropriate laboratory studies ordered.





I discussed this plan of care with the patient, who verbalized understanding, a

nd who is amenable to this plan of care.








Reevaluation #2: 





20 01:13


Vital signs, laboratory studies fairly unremarkable.  Urinalysis pending.  

Patient walking, and in no acute distress.  At this point time, patient does not

 appear to have an immediate medical contraindication to psychiatric evaluation,

 consultation, and placement, if they deem it necessary.


Reevaluation #3: 





20 01:50


care will be transferred to Dr KAREEN Michael to follow up on 





ED Medical Decision Making





- Lab Data


Result diagrams: 


                                 20 00:15





                                 20 00:15








                                   Vital Signs











  20





  00:42


 


Temperature 97.3 F L


 


Pulse Rate 80


 


Respiratory 16





Rate 


 


Blood Pressure 110/70





[Right] 


 


O2 Sat by Pulse 99





Oximetry 











                                   Lab Results











  20 Range/Units





  00:15 00:15 00:15 


 


WBC  7.0    (4.5-11.0)  K/mm3


 


RBC  4.52    (3.65-5.03)  M/mm3


 


Hgb  14.8    (11.8-15.2)  gm/dl


 


Hct  43.8    (35.5-45.6)  %


 


MCV  97 H    (84-94)  fl


 


MCH  33 H    (28-32)  pg


 


MCHC  34    (32-34)  %


 


RDW  14.8    (13.2-15.2)  %


 


Plt Count  231    (140-440)  K/mm3


 


Sodium   136 L   (137-145)  mmol/L


 


Potassium   4.2   (3.6-5.0)  mmol/L


 


Chloride   98.7   ()  mmol/L


 


Carbon Dioxide   22   (22-30)  mmol/L


 


Anion Gap   20   mmol/L


 


BUN   22 H   (9-20)  mg/dL


 


Creatinine   0.8   (0.8-1.3)  mg/dL


 


Estimated GFR   > 60   ml/min


 


BUN/Creatinine Ratio   28   %


 


Glucose   145 H   ()  mg/dL


 


Calcium   9.2   (8.4-10.2)  mg/dL


 


Magnesium    2.10  (1.7-2.3)  mg/dL


 


Total Creatine Kinase    222 H  ()  units/L


 


Salicylates     (2.8-20.0)  mg/dL


 


Acetaminophen     (10.0-30.0)  ug/mL


 


Valproic Acid     ()  ug/mL


 


Plasma/Serum Alcohol     (0-0.07)  %














  20 Range/Units





  00:15 00:15 00:15 


 


WBC     (4.5-11.0)  K/mm3


 


RBC     (3.65-5.03)  M/mm3


 


Hgb     (11.8-15.2)  gm/dl


 


Hct     (35.5-45.6)  %


 


MCV     (84-94)  fl


 


MCH     (28-32)  pg


 


MCHC     (32-34)  %


 


RDW     (13.2-15.2)  %


 


Plt Count     (140-440)  K/mm3


 


Sodium     (137-145)  mmol/L


 


Potassium     (3.6-5.0)  mmol/L


 


Chloride     ()  mmol/L


 


Carbon Dioxide     (22-30)  mmol/L


 


Anion Gap     mmol/L


 


BUN     (9-20)  mg/dL


 


Creatinine     (0.8-1.3)  mg/dL


 


Estimated GFR     ml/min


 


BUN/Creatinine Ratio     %


 


Glucose     ()  mg/dL


 


Calcium     (8.4-10.2)  mg/dL


 


Magnesium     (1.7-2.3)  mg/dL


 


Total Creatine Kinase     ()  units/L


 


Salicylates  < 0.3 L    (2.8-20.0)  mg/dL


 


Acetaminophen   5.0 L   (10.0-30.0)  ug/mL


 


Valproic Acid  5.9 L    ()  ug/mL


 


Plasma/Serum Alcohol    < 0.01  (0-0.07)  %











Critical care attestation.: 


If time is entered above; I have spent that time in minutes in the direct care 

of this critically ill patient, excluding procedure time.








ED Disposition


Clinical Impression: 


 Medical clearance for psychiatric admission





Disposition: DC- TO HOME OR SELFCARE


Is pt being admited?: No


Does the pt Need Aspirin: No


Condition: Stable


Additional Instructions: 


In case of an emergency, please contact the following numbers:











[place] Crisis and Access Line: Number: 0-165-421-7841


 


Crisis Text Line: (Text START) Number: 134870


 


Suicide Prevention Line: Number: 5-391-608-1774


 


Emergency Number: 911








SUBSTANCE ABUSE PROGRAMS:





[facility]:


Location: [place], [place]


Phone: [phone]


[facility]


Address: [address] [place] [address]


Phone: [phone]





[facility]:





Address: [address] [place] [address]


Phone: [phone]





[facility]:


 


Address: [address] [place] [address]


Phone: [phone]








[facility]:


Address: [address] [place] [address]


Phone: [phone]





[facility]





Address: [address] [place] [address].


Phone: [phone]


Please contact above numbers to attempt placement into free based program.


Medicaid Programs: 





[facility]:


Address: [address] [place] [address]


Phone: [phone]





[facility]:


Address: [address] [place] [address]


Phone: [phone]











                       OUTPATIENT MENTAL HEALTH RESOURCES





[facility]         Makenzie Titus MD:


[address],                [address]


[place] [address]               [place] [address]


 [phone]





[facility]:              [address]               [address]


[place] [address]            [place] [address]


[phone]





[facility]:      [facility]: 


[address]  [address]      [address] [address]


[place] [address]               [facility] [place] [address]


[phone]





[facility]:     Gonzales Pineda MD:


[address]                 [address] [place], [place]                       [place] [address]


[phone]





[facility]:   


[address] [place] [address] [phone]


                  **[place] CRISIS AND ACCESS LINE: [phone]**

















                Outpatient [facility]:





[facility] ([facility])


[address] [place] [address] 


Phone: [phone]/ 1 


Monday thru Friday - 8am - 5pm





[facility]


Address: [address] [place] [address]


Monday thru Friday- 7am-2pm


Phone: [phone]








[facility]


Address: [address] [place] [address]


Monday thru Friday: 8:30AM-5PM


Phone: [phone]





****CRISIS RESOURCES****


[place] Crisis Line: 1-200.517.4186


Suicide Prevention Line: 1-826.223.6207


Crisis Text Line: Text START to 613245


Emergency: 911











Referrals: 


[facility] [place] [Primary Care Provider] - 3-5 Days

## 2020-09-30 NOTE — CONSULTATION
History of Present Illness





- Reason for Consult


Consult date: 09/30/20


Reason for consult: suicidal thoughts





- History of Present Psychiatric Illness


The patient's medical record was reviewed and the patient's progress was 

discussed with the nursing staff. The sitter states the patient has been calm 

and cooperative with no abnormal behaviors.





During my interview with the patient this morning he is lying down. He is awake.

He is a/o x 3. He is calm and cooperative. He makes good eye contact. The 

patient states he came in for "having suicidal thoughts." He states "but not 

now. I don't want to hurt myself or nobody." He says "yesterday I felt a little 

paranoid, but I feel a lot better today." He then says "I feel like going 

home."' The patient then states, "I guess I just needed to rest." He denies 

hallucinations of any kind. The patient states "I feel pretty good" when asked 

about his mood. He denies any illicit drug use other than "weed." The patient 

says he drinks "a case of beer every few days." He denies any current withdrawal

symptoms but says "sometimes I get shakes." The patient says he has a past 

history of "schizophrenia and bipolar." He says he takes "depakote, haldol, 

cogentin, and invega." The patient says he "takes his meds everyday." The 

patient says he's been admitted "about 100 times" for psych related conditions. 

He says he's had "one suicide attempt" in the past. 





PAST PSYCHIATRIC HISTORY:


Diagnoses: Schizophrenia


Suicide attempts or Self-harm behavior: "100 times"


Prior psychiatric hospitalizations: "one"


Substance Abuse history: "alcohol and weed"


Previous psychiatric medications tried: depakote, haldol, cogentin, invega


Outpatient treatment: yes





PAST MEDICAL HISTORY: None reported





Family Psychiatric History: None reported or documented





SOCIAL HISTORY


Marital Status: Single


Living Arrangements: with a friend


Employment Status: Disabled


Access to guns/weapons: Denies


Education: 10th grade


History of Abuse: Denies


Legal History: none reported





REVIEW OF SYSTEMS


Constitutional: Negative for weight loss


ENT: Negative for stridor


Respiratory: Negative for cough or hemoptysis


All other systems reviewed and are negative





MENTAL STATUS EXAMINATION


General Appearance: Dressed appropriately


Behavior: calm and cooperative. good eye contact


Mood: "pretty good"


Affect and affective range: Congruent with stated mood


Thought Process: goal directed


Speech: normal rate and volume


Thought Content:


     Suicidal Ideation: Denies


     Homicidal Ideation: Denies 


     Hallucinations: Denies


     Delusions: None elicited


Insight and Judgment: Limited


Memory/Cognition: Limited


Attention: Normal





ASSESSMENT


Schizoaffective Disorder, Bipolar Type





Treatment Plan


Continue home medications as previously prescribed


Sitter: Defer to primary


Medical: Per primary


Disposition: Do not recommend acute inpatient treatment. He may discharge once 

medically cleared. The patient understands that if suicidal or homicidal 

tendencies arise he is to seek immediate assistance, including but not limited 

to the crisis hotline, 911/and or ER.


The  is to further discuss the safety plan.


The  is to give the patient resources for cognitive behavioral therapy 

and alcohol detox programs


The patient is to follow up with outpatient psych and primary in 7 to 14 days 

upon discharge


Will sign off. Thank you for this consult. 








Medications and Allergies


                                    Allergies











Allergy/AdvReac Type Severity Reaction Status Date / Time


 


quetiapine fumarate Allergy  Unknown Verified 09/29/20 08:14





[From Seroquel]     











                                Home Medications











 Medication  Instructions  Recorded  Confirmed  Last Taken  Type


 


Benztropine [Cogentin] 1 mg PO QHS #30 tablet 04/28/17 09/29/20 10/20/19 Rx


 


Divalproex Dr [Depakote Dr] 500 mg PO BID #60 tablet 04/28/17 09/29/20 10/20/19 

Rx


 


haloperidoL [Haldol] 5 mg PO HS #30 tablet 04/28/17 09/29/20 10/20/19 Rx


 


Paliperidone Palmitate [Invega 234 mg IM QMONTH 10/21/19 09/29/20 10/08/19 

History





Sustenna(Nf)]     











Active Meds: 


Active Medications





Benztropine Mesylate (Cogentin)  1 mg PO QHS JE


Divalproex Sodium (Depakote Dr)  500 mg PO BID JE


Haloperidol (Haldol)  5 mg PO HS JE


Haloperidol Lactate (Haldol)  5 mg IM Q6HR PRN


   PRN Reason: Agitation


Lorazepam (Ativan)  2 mg IM Q4HR PRN


   PRN Reason: Agitation











Mental Status Exam





- Vital signs


                                Last Vital Signs











Temp  97.8 F   09/30/20 07:46


 


Pulse  69   09/30/20 07:46


 


Resp  20   09/30/20 07:46


 


BP  98/63   09/30/20 07:46


 


Pulse Ox  96   09/30/20 07:46














Results


Result Diagrams: 


                                 09/29/20 00:15





                                 09/29/20 00:15


All other labs normal.

## 2023-09-20 NOTE — CONSULTATION
History of Present Illness





- Reason for Consult


Consult date: 04/24/17


Reason for consult: Mental Health Evaluation


Requesting physician: RACHEL FUNK





- Chief Complaint


Chief complaint: 


"I wasn't feeling right"








- History of Present Psychiatric Illness


This is a 27-year-old AA male. He presents to the ER complaining of suicidality 

or hallucinations. Today patient is calm and cooperative with a circumstantial 

thought process. He admits that he felt "weird and strange" on admission. He 

could not tell me if he was suicidal that day. Patient had previous admission (

[facility] 4/10/17) and than transferred to [facility] for psychosis. He 

stated that he was there for 5 days before being discharged. I asked the 

patient to compare how he feel mentally (on admission and today). He stated 

that he feel a lot better today. He could not remember if he was compliant with 

his psy medications once he was discharged from [facility]. His 

current VA level is 11.6. He denies SI/HI's and AVH's at this time. He denies 

depression, sleep issues or a poor appetite. He states that he is a social 

drinker. Patient would like to return to [place]. 








Medications and Allergies


 Allergies











Allergy/AdvReac Type Severity Reaction Status Date / Time


 


quetiapine fumarate Allergy  Unknown Verified 02/19/17 07:33





[From Seroquel]     











 Home Medications











 Medication  Instructions  Recorded  Confirmed  Last Taken  Type


 


Divalproex Dr [Depakote] 500 mg PO BID 10/06/14 04/23/17 04/09/17 History


 


Benztropine [Cogentin] 1 mg PO QHS 03/06/15 04/23/17 04/09/17 History


 


risperiDONE [RisperDAL] 5 mg PO QHS 10/08/15 04/23/17 04/09/17 History


 


Haloperidol [Haldol] 5 mg PO  02/19/17 04/23/17 04/09/17 History











Active Meds: 


Active Medications





Benztropine Mesylate (Cogentin)  1 mg PO QHS [facility]


   Last Admin: 04/23/17 22:18 Dose:  1 mg


Divalproex Sodium (Depakote Dr)  500 mg PO BID [facility]


   Last Admin: 04/24/17 10:04 Dose:  500 mg


Doxycycline Hyclate (Vibramycin)  100 mg PO BID [facility]


   Stop: 04/27/17 10:01


   Last Admin: 04/24/17 10:04 Dose:  100 mg


Haloperidol (Haldol)  5 mg PO [facility]


   Last Admin: 04/23/17 22:18 Dose:  5 mg


Lorazepam (Ativan)  2 mg IM Q4HR PRN


   PRN Reason: Agitation


Risperidone (Risperdal)  3 mg PO QHS [facility]


   Last Admin: 04/23/17 22:18 Dose:  3 mg


Risperidone (Risperdal)  2 mg PO QHS [facility]


   Last Admin: 04/23/17 22:18 Dose:  2 mg











Past psychiatric history





- Past Medical History


Past Medical History: No medical history, other


Past Surgical History: No surgical history





- past Psychiatric treatment and history


Psych: Bipolar, Depression, Schizophrenia


psychiatric treatment history: 


multiple Psy facilities in the [facility] area. Denies fam hx pf psy.








- Social History


Social history: Lives alone (Did not finish , Live at [place]), other





Mental Status Exam





- Vital signs


 Last Vital Signs











Temp  98.8 F   04/23/17 21:41


 


Pulse  82   04/23/17 21:41


 


Resp  20   04/24/17 06:12


 


BP  100/65   04/23/17 21:41


 


Pulse Ox  100   04/24/17 06:12














- Exam


Narrative exam: 


ROS (-) psychosis, (-) depression





MSE:





 Appearance: calm, cooperative 


 Behavior: poor eye contact 


 Speech: regular rate and tone 


 Mood: "Not depressed" 


 Affect: flat


 Thought Process: circumstantial  


 Thought Content: denies SI/HI's and AVH's


 Motor Activity: ambulatory 


 Cognition: A/Ox3


 Insight: poor


 Judgment: fair





Results


Result Diagrams: 


 04/23/17 01:01





 04/23/17 01:01


All other labs normal.








Assessment and Plan


Assessment and plan: 


Impression: Unspecified Mood DO. This is a 27-year-old AA male. He presents to 

the ER complaining of suicidality or hallucinations. Today patient is calm and 

cooperative with a circumstantial thought process. He admits that he felt "

weird and strange" on admission. He could not tell me if he was suicidal that 

day. Patient had previous admission ([facility] 4/10/17) and than transferred to [facility] for psychosis. Patient denies SI/HI;s and AVH's. VA level 11.7.





DD: R/O Bipolar, Schizoaffective DO





Recommendation/Plan: Continue 1013 with possible placement to outpatient 

services. Patient would benefit from an IOP setting. Outreach attempt was made to schedule a Medicare Wellness Visit. This was the first attempt. Contact was made, no appointment scheduled     Will call back in a month, her  is in the hospital, and she doesn't have transportation.